# Patient Record
(demographics unavailable — no encounter records)

---

## 2024-10-07 NOTE — HISTORY OF PRESENT ILLNESS
[Former] : former [>= 20 pack years] : >= 20 pack years [FreeTextEntry1] : Hx COPD.  Was here for acute visit 9/20/24 c/o one month ago was cleaning mold in his [place]. Used a bathroom  and the fumes really bothered him while he was scrubbing and took big inhale. Did not have any windows. Since then, his lungs feels scorched, more sob, wheeze. No cough.    Given course of prednisone. CXR 9/20/24 clear  Now feeling well. Back to baseline. PINA back to usual. Felt better on prednisone.   On breztri with spacer.  Albuterol helps a little.  On singulair. Not on daliresp b/c it didn't agree with him.    [place] done 3/28/24 showed severe obstruction; FEV1 not much change 39% pred  Does not do well with dry powder inhalers.   Hx hypoxia with exertion. Using it with exertion mainly.  Some LE edema; b/l.   Sees Dr Richardson.  Previously was ordered for [place]. Reports was called by [facility] [place]; has to call them back. Never did at first b/c of COVID. Now saying too busy.  Quit smoking sandra 2014. Smoked 40 years 2 ppd.   [YearQuit] : 2014

## 2024-10-07 NOTE — PROCEDURE
[FreeTextEntry1] :  CXR 24 clear  oxymetry done 24: O2 saturation on RA at rest: 95% O2 saturation on RA walkin% O2 saturation on 2 LPM pulsed dose walkin%     ------------   EXAM: [id number] - CT LDCT LUNG CA SCREENING - ORDERED BY: RITA LINDO   PROCEDURE DATE: 2024    INTERPRETATION: INDICATION: 100 pack year history of smoking. Individual is Former smoker. Lung cancer screening.  TECHNIQUE: Low dose CT scan of the chest was obtained without intravenous contrast.  COMPARISON: 2023  FINDINGS: Lymph nodes: No enlarged mediastinal, hilar or axillary lymph nodes Heart/vasculature: No pericardial effusion. Thoracic aorta and main pulmonary artery are normal in caliber. Mild atherosclerotic calcifications coronary arteries.  Airways/lungs/pleura: Trachea and central airways are patent. Bilateral upper lobe predominant emphysematous changes. No parenchymal consolidation, pleural effusion or pneumothorax. No bronchiectasis or honeycombing. Scattered calcified granulomas bilaterally.  Right middle lobe 0.2 cm nodule (series 5 image 644), possibly new from prior.. Unchanged right apical 0.2 cm nodule.  Upper abdomen: Partially imaged upper abdomen demonstrates no acute abnormality.  Bones/soft tissues: No suspicious lytic or blastic lesion.  IMPRESSION:  Right middle lobe 0.2 cm nodule, possibly new from prior examination. Unchanged right apical 0.2 cm nodule.  Lung-RADS category: 2: Benign appearance or behavior. Nodules with very low likelihood of becoming a clinically active cancer due to size or lack of growth. Probability of malignancy < 1%.  Recommendation: Continue annual screening with low-dose chest CT in 12 months.  --- End of Report ---       ELLIOT LANDAU MD; Attending Radiologist This document has been electronically signed. 2024 7:28PM

## 2024-10-07 NOTE — PHYSICAL EXAM
[General Appearance - In No Acute Distress] : no acute distress [Normal Conjunctiva] : the conjunctiva exhibited no abnormalities [Low Lying Soft Palate] : low lying soft palate [Neck Appearance] : the appearance of the neck was normal [] : the neck was supple [Heart Rate And Rhythm] : heart rate and rhythm were normal [Heart Sounds] : normal S1 and S2 [Bowel Sounds] : normal bowel sounds [Abdomen Soft] : soft [Abnormal Walk] : normal gait [Nail Clubbing] : no clubbing of the fingernails [Cyanosis, Localized] : no localized cyanosis [1+ Pitting] : 1+  pitting [Skin Color & Pigmentation] : normal skin color and pigmentation [Cranial Nerves] : cranial nerves 2-12 were intact [No Focal Deficits] : no focal deficits [Oriented To Time, Place, And Person] : oriented to person, place, and time [Impaired Insight] : insight and judgment were intact [Affect] : the affect was normal [Normal Rate] : the respiratory rate was normal [Rate ___] : at [unfilled] breaths per minute [Normal Rhythm/Effort] : normal respiratory rhythm and effort [Clear Bilaterally] : the lungs were clear to auscultation bilaterally [Scattered Wheezes] : scattered wheezing was heard [Rales / Crackles Bilateral] : no rales or crackles were heard [Normal Oropharynx] : abnormal oropharynx

## 2024-10-07 NOTE — PLAN
[TextEntry] : Continue breztri. Albuterol prn.  Singulair. O2 to be used with exertion and sleep. Make sure he wears a mask and is in a well-ventilated area if he has to use cleaning products. No Eos so dupixent not indicated. Could not tolerate daliresp. Emphysema pattern does not appear amenable to EBV. GI eval. Flonase, antihistamine. Nasal saline. Cardiology f/u with Dr Richardson. LDCT 3/2025.  Rec pulm rehab; he says he can't do it. Exercise is essential. Annual flu vaccine.

## 2024-10-07 NOTE — REVIEW OF SYSTEMS
[As Noted in HPI] : as noted in HPI [Negative] : Endocrine [Fever] : no fever [Fatigue] : no fatigue [Snoring] : no snoring [Witnessed Apneas] : demonstrated no ~M apnea [Nonrestorative Sleep] : restorative sleep

## 2024-10-07 NOTE — CONSULT LETTER
[Dear  ___] : Dear  [unfilled], [Courtesy Letter:] : I had the pleasure of seeing your patient, [unfilled], in my office today. [Consult Closing:] : Thank you very much for allowing me to participate in the care of this patient.  If you have any questions, please do not hesitate to contact me. [Robert Holder MD] : Robert Holder MD

## 2024-10-10 NOTE — DATA REVIEWED
[Cervical Spine] : cervical spine [MRI] : MRI [Lumbar Spine] : lumbar spine [Report was reviewed and noted in the chart] : The report was reviewed and noted in the chart [I reviewed the films/CD] : I reviewed the films/CD

## 2024-10-10 NOTE — ASSESSMENT
[FreeTextEntry1] : A discussion regarding available pain management treatment options occurred with the patient.  These included interventional, rehabilitative, pharmacological, and alternative modalities. We will proceed with the following:    Interventional treatment options:   - Proceed with right C3-C4, C4-C5, C5-C6 facet joint RFA with fluoroscopic guidance; ASC - Consider interventional treatment options for lumbar spine once adequate relief of cervical - see additional instructions below    Rehabilitative options:   - Previously recommended trial of physical therapy for cervical spine; patient defers - participation in active HEP was discussed and encouraged as tolerated  Medication based treatment options:   - Continue ibuprofen 400-600 mg up to TID as needed - Hold NSAIDs while on oral corticosteroids (per pulmonary) - see additional instructions below    Complementary treatment options:   - Weight management and lifestyle modifications discussed   - Failed prior chiropractic care  Additional treatment recommendations as follows: - Discussed spine surgical evaluation; patient defers at this point - Follow up 4-6 weeks post RFA for assessment of efficacy and further treatment recommendations  The risks, benefits and alternatives of the proposed procedure were explained in detail with the patient. The risks outlined include but are not limited to infection, bleeding, post- dural puncture headache, nerve injury, a temporary increase in pain, failure to resolve symptoms, need for future interventions, allergic reaction, and possible elevation of blood sugar in diabetics if using corticosteroid.  All questions were answered to patient's apparent satisfaction, and he/she verbalized an understanding.  Patient has cervical axial pain that is consistent with facet joint pathology.  A diagnostic temporary block with local anesthetic of the medial branch was performed and has resulted in at least a 50% reduction in pain for the duration of the specific local anesthetic effect.  The pain is not radicular and there is absence of nerve root compression.  There is no prior spinal fusion surgery at the level targeted.  The pain has failed to respond to three months of conservative therapy.  We have discussed the risks, benefits, and alternatives for NSAID therapy including but not limited to the risk of bleeding, thrombosis, gastric mucosal irritation/ulceration, allergic reaction and kidney dysfunction.  The patient verbalizes an understanding.

## 2024-10-10 NOTE — PHYSICAL EXAM
[de-identified] : Constitutional:   - No acute distress   - Well developed; well nourished    Neurological:   - normal mood and affect   - alert and oriented x 3     Cardiovascular:   - grossly normal   Cervical Spine Exam:   Inspection:   erythema (-)   ecchymosis (-)   rashes (-)    Palpation:                                                    Cervical paraspinal tenderness:         R (-); L(-)  Upper trapezius tenderness:              R (+); L (+)  Rhomboids tenderness:                      R (+); L (+)  Occipital Ridge:                                    R (-); L (-)  Supraspinatus tenderness:                 R (-); L (-)   ROM: Restricted all planes pain with extremes of bilateral rotation  Strength Testing:              Deltoid                           R (5/5); L (5/5)  Biceps:                          R (5/5); L (5/5)  Triceps:                         R (5/5); L (5/5)  Finger Abductors:         R (5/5); L (5/5)  Grasp:                           R (5/5); L (5/5)   Special Testing:  Spurling Test:                  R (-); L (-)  Facet load test:               R (+); L (-)  Avila's Sign:               R (-); L (-)   Neuro:  SILT throughout right upper extremity  SILT throughout left upper extremity   Reflexes:  Biceps   -           R (2+); L (2+)  Triceps  -           R (2+); L (2+)  Brachioradialis- R (2+); L (2+)     No ankle clonus    _____________________________________________________  Lumbar Spine Exam:  Inspection: erythema (-) ecchymosis (-) rashes (-) alignment: no scoliosis  Palpation: Midline lumbar tenderness:             (-) midline thoracic tenderness:          (-) Lumbar paraspinal tenderness:      L (+) ; R (+) thoracic paraspinal tenderness:    L (-) ; R (-) sciatic nerve tenderness :             L (-) ; R (-) SI joint tenderness:                        L (-) ; R (-) GTB tenderness:                            L (-);  R (-)  ROM: restricted all planes  pain with extremes of flexion/extension  Strength:                                    Right       Left    Hip Flexion:                (5/5)       (5/5) Quadriceps:               (5/5)       (5/5) Hamstrings:                (5/5)       (5/5) Ankle Dorsiflexion:     (5/5)       (5/5) EHL:                           (5/5)       (5/5) Ankle Plantarflexion:  (5/5)       (5/5)  Special Tests: SLR:                           R (+) ; L (+) Facet loading:            R (+) ; L (+) JUDI test:               R (n/a) ; L (n/a) Hamstring tightness:  R (+);  L (+)  Neurologic: SI LT throughout right lower extremity SI LT throughout left lower extremity  Reflexes normal and symmetric bilateral lower extremities  Gait:  mildly antalgic gait ambulates without assistive device

## 2024-10-10 NOTE — HISTORY OF PRESENT ILLNESS
[Neck] : neck [9] : 9 [6] : 6 [Radiating] : radiating [Stabbing] : stabbing [Throbbing] : throbbing [Constant] : constant [Injection therapy] : injection therapy [FreeTextEntry1] : 10/10/2024 - Patient presents for follow-up visit after a repeat right C3-C4, C4-C5, C5-C6 facet joint MBB on 2024.  He reports 50% reduction of pain the day of the procedure and a gradual return of pain to baseline the following day. Symptoms remain stable and unchanged at this time. Denies any changes in medical history since last visit.  24 - Patient presents for FUV after a Right C3-C4, C4-C5, C5-C6 facet joint MBB on 24. He reports greater than 80% reduction of his pain the day of the procedure followed by return to baseline by the next morning.  Positive diagnostic response.  2024 - Patient presents for FUV after a cervical C7-T1 BENIGNO on 2024. Patient reports soreness in the neck down to the mid back following the injection, after a couple days the soreness subsided.  He states that he got some reduction of pain intensity from the epidural, but he continues to have pain in the right side of the neck radiating to the right upper trapezius area and shoulder.  He did not start physical therapy as recommended.  2024 - Patient presents for FUV to review their cervical MRI from 2024. Patient continues to have pain is in the neck with radiation bilaterally to the upper trapezius area; he has subjective weakness bilaterally in the arms and hands as well as having loss of some balance and fine motor coordination.   2024 - The patient presents for initial evaluation regarding their neck pain. Patient has a long-standing history of pain, he has had injections in the past with Dr. Ojeda but had to change providers due to insurance.  Most recent injections were performed about 2-3 months ago from his recollection.  He states that injections provided very limited short-term relief. Patients pain is in the neck with radiation bilaterally to the upper trapezius area; he has subjective weakness bilaterally in the arms and hands as well as having loss of some balance and fine motor coordination.   Injections outside pain MD): 1) BENIGNO (2024) - Dr. Ojeda  Interventional pain history: 1) C7-T1 BENIGNO - (2024) 2) Right C3-C4, C4-C5, C5-C6 facet joint MBB - (24, 2024)  Pertinent Surgical History: N/A   Imagin) MRI Cervical Spine (2024) - [facility] [place] [facility]  C2-C3 level: Mild bilateral facet arthropathy. No disc herniation. No central canal or foraminal narrowing. C3-C4 level: Broad-based posterior disc osteophyte complex and mild to moderate bilateral facet arthropathy result in moderate bilateral neural foraminal narrowing but no significant central canal narrowing. C4-C5 level:  Broad-based posterior disc bulge with superimposed right intraforaminal protrusion in conjunction with moderate bilateral facet arthropathy results in mild to moderate left neural foraminal narrowing, moderate right neural foraminal narrowing but no significant central canal narrowing. C5-C6 level: Broad-based posterior disc osteophyte complex and moderate bilateral facet arthropathy result in mild to moderate central canal narrowing, moderate right neural foraminal narrowing and mild to moderate left neural foraminal narrowing. C6-C7 level: Broad-based posterior disc bulge and moderate bilateral facet arthropathy result in moderate bilateral neural foraminal narrowing but no significant central canal narrowing. C7-T1 level: No disc herniation. No central canal or foraminal narrowing.  2) MRI Lumbar Spine (2024) - [facility] [place] [facility]  L1-L2: Minimal disc bulging without spinal canal or foraminal narrowing. L2-L3: Moderate disc height loss with moderate disc bulging and endplate osteophyte formation. Mild foraminal narrowing. L3-L4: Moderate disc bulging with moderate disc height loss. Small endplate osteophyte formation. Moderate left and mild right foraminal narrowing. Mild lateral recess stenosis. L4-L5: Moderate disc bulging and small endplate osteophyte formation. Mild to moderate bilateral foraminal narrowing. Moderate left and mild right lateral recess stenosis. L5-S1: Moderate disc bulging with small endplate osteophyte formation. Moderate to severe bilateral foraminal narrowing  Physician Disclaimer: I have personally reviewed and confirmed all HPI data with the patient.  [] : no [FreeTextEntry7] : b/l shoulder blades  [de-identified] : Driving  [de-identified] : C MRI

## 2024-10-28 NOTE — REVIEW OF SYSTEMS
S/P  [Postnasal Drip] : postnasal drip [Nasal Discharge] : nasal discharge [Shortness Of Breath] : shortness of breath [Wheezing] : wheezing [Cough] : cough [Dyspnea on Exertion] : dyspnea on exertion [Negative] : Heme/Lymph

## 2024-10-28 NOTE — HISTORY OF PRESENT ILLNESS
[FreeTextEntry8] : 8 days of cough , sore throat , no fever, productive mucus white , trouble sleeping  some nasal congestion  states went to urgent care last week given doxycycline and prednisone for 4 days  states completed and still persisting symptoms - , positive sob , no chest pain , no abdominal pain  chronic left heel pain worse with stepping on it

## 2024-10-28 NOTE — PHYSICAL EXAM
[No Acute Distress] : no acute distress [Well Nourished] : well nourished [Well Developed] : well developed [Well-Appearing] : well-appearing [Normal Voice/Communication] : normal voice/communication [Normal Sclera/Conjunctiva] : normal sclera/conjunctiva [PERRL] : pupils equal round and reactive to light [EOMI] : extraocular movements intact [Normal Outer Ear/Nose] : the outer ears and nose were normal in appearance [Normal Oropharynx] : the oropharynx was normal [Normal TMs] : both tympanic membranes were normal [No JVD] : no jugular venous distention [No Lymphadenopathy] : no lymphadenopathy [Supple] : supple [No Respiratory Distress] : no respiratory distress  [No Accessory Muscle Use] : no accessory muscle use [Normal Rate] : normal rate  [Regular Rhythm] : with a regular rhythm [Normal S1, S2] : normal S1 and S2 [No Murmur] : no murmur heard [No Edema] : there was no peripheral edema [Soft] : abdomen soft [Non Tender] : non-tender [Non-distended] : non-distended [No Masses] : no abdominal mass palpated [No HSM] : no HSM [Normal Bowel Sounds] : normal bowel sounds [Normal Posterior Cervical Nodes] : no posterior cervical lymphadenopathy [Normal Anterior Cervical Nodes] : no anterior cervical lymphadenopathy [No Joint Swelling] : no joint swelling [Grossly Normal Strength/Tone] : grossly normal strength/tone [Coordination Grossly Intact] : coordination grossly intact [No Focal Deficits] : no focal deficits [Normal Gait] : normal gait [Speech Grossly Normal] : speech grossly normal [Normal Affect] : the affect was normal [Alert and Oriented x3] : oriented to person, place, and time [Normal Mood] : the mood was normal [Normal Insight/Judgement] : insight and judgment were intact [de-identified] : coarse bs and wheezing throughout  [de-identified] : left heel mild tenderness to deep palpation  [de-identified] : ecchymosis on forearm and hyperpigmentation of bilateral arms

## 2024-10-28 NOTE — ASSESSMENT
[FreeTextEntry1] : stat chest xray r/o pneumonia  discussed compliance with inhalers  start prednisone taper   followup labs  xray foot r/o heel spur , discussed rolling foot on frozen water bottle

## 2024-11-07 NOTE — PROCEDURE
[FreeTextEntry1] : [place] done 24: mod obstruction by ATS criteria; severe by GOLD; FEV1 decreased from previous   CXR 24 clear  oxymetry done 24: O2 saturation on RA at rest: 95% O2 saturation on RA walkin% O2 saturation on 2 LPM pulsed dose walkin%     ------------   EXAM: [id number] - CT LDCT LUNG CA SCREENING - ORDERED BY: RITA LINDO   PROCEDURE DATE: 2024    INTERPRETATION: INDICATION: 100 pack year history of smoking. Individual is Former smoker. Lung cancer screening.  TECHNIQUE: Low dose CT scan of the chest was obtained without intravenous contrast.  COMPARISON: 2023  FINDINGS: Lymph nodes: No enlarged mediastinal, hilar or axillary lymph nodes Heart/vasculature: No pericardial effusion. Thoracic aorta and main pulmonary artery are normal in caliber. Mild atherosclerotic calcifications coronary arteries.  Airways/lungs/pleura: Trachea and central airways are patent. Bilateral upper lobe predominant emphysematous changes. No parenchymal consolidation, pleural effusion or pneumothorax. No bronchiectasis or honeycombing. Scattered calcified granulomas bilaterally.  Right middle lobe 0.2 cm nodule (series 5 image 644), possibly new from prior.. Unchanged right apical 0.2 cm nodule.  Upper abdomen: Partially imaged upper abdomen demonstrates no acute abnormality.  Bones/soft tissues: No suspicious lytic or blastic lesion.  IMPRESSION:  Right middle lobe 0.2 cm nodule, possibly new from prior examination. Unchanged right apical 0.2 cm nodule.  Lung-RADS category: 2: Benign appearance or behavior. Nodules with very low likelihood of becoming a clinically active cancer due to size or lack of growth. Probability of malignancy < 1%.  Recommendation: Continue annual screening with low-dose chest CT in 12 months.  --- End of Report ---       ELLIOT LANDAU MD; Attending Radiologist This document has been electronically signed. 2024 7:28PM

## 2024-11-07 NOTE — CONSULT LETTER
[Dear  ___] : Dear  [unfilled], [Courtesy Letter:] : I had the pleasure of seeing your patient, [unfilled], in my office today. [Consult Closing:] : Thank you very much for allowing me to participate in the care of this patient.  If you have any questions, please do not hesitate to contact me. [Rboert Holder MD] : Robert Holder MD

## 2024-11-07 NOTE — HISTORY OF PRESENT ILLNESS
[Former] : former [>= 20 pack years] : >= 20 pack years [FreeTextEntry1] : Hx COPD.  Was here for acute visit 9/20/24 c/o one month prior was cleaning mold in his BR. Used a bathroom  and the fumes really bothered him while he was scrubbing and took big inhale. Did not have any windows. Since then, his lungs feels scorched, more sob, wheeze. No cough.    Given course of prednisone 9/20/24. CXR 9/20/24 clear  2 weeks ago began with increased sob, chest congestion, difficult to expectorate, wheeze.   ANother course of prednisone a few weeks ago.   On breztri with spacer.  Albuterol helps a little.  On singulair. Not on daliresp b/c it didn't agree with him.    [place] done 3/28/24 showed severe obstruction; FEV1 not much change 39% pred  Does not do well with dry powder inhalers.   Hx hypoxia with exertion. Using it with exertion mainly.  Some LE edema; b/l.   Sees Dr Richardson.  Previously was ordered for [place]. Reports was called by [facility] [place]; has to call them back. Never did at first b/c of COVID. Now saying too busy.  Quit smoking [place]ra 2014. Smoked 40 years 2 ppd.   [YearQuit] : 2014

## 2024-11-07 NOTE — PLAN
[TextEntry] : For now, another course of prednisone. Add Zithro TIW as maintenance. Get acapella valve; gave instructions and picture of the device. Continue breztri. Albuterol prn.  Singulair. O2 to be used with exertion and sleep. Make sure he wears a mask and is in a well-ventilated area if he has to use cleaning products. No Eos so dupixent not indicated. Could not tolerate daliresp. Emphysema pattern does not appear amenable to EBV. GI eval. Flonase, antihistamine. Nasal saline. Cardiology f/u with Dr Richardson. LDCT 3/2025.  Recc pulm rehab; he says he can't do it. Exercise is essential. Annual flu vaccine.   Lung transplant referral sent as well to [facility] Lung Transplant Team.

## 2024-11-11 NOTE — END OF VISIT
[FreeTextEntry3] : I saw and examined the pt Mr. Guy at [facility] onnov 11 2024  [Time Spent: ___ minutes] : I have spent [unfilled] minutes of time on the encounter which excludes teaching and separately reported services.

## 2024-11-11 NOTE — HISTORY OF PRESENT ILLNESS
[Former] : former [TextBox_4] : HPI: 67 y/o male PMH COPD (2 ppd for 40 years, stopped 2014), referred by Dr. Holder for transplant evaluation. No  presents with increasing excercise intolerance and dyspnea at rest NYHA class 3    PMH: cer  When/how diagnosed with primary pulm dx?  PSH: social etoh  - smoking - drugs  Meds:  Allergies: NK       Oxygen Requirement:  *** at rest ** on exertion  *** with sleep   High Flow Nasal cannula: [ ] yes [x ] no    Working for income:   [ ] yes [ ] no     Diabetic: [ ] yes [ ] no    Insulin dependent [ ] yes [x ] no       Performs activities of daily living with NONE/SOME/TOTAL assistance                 Occupation:      Exposures:      Quality of life:      Attends Pulmonary Rehab: N, was referred by Dr. Holder but patient states he is "too busy" Prior hospitalizations, ICU admission or intubations:  Hx covid infection, blood transfusions or pregnancies:             Health maintenance/vaccines:      COVID vax:       Family History:  Social History:      Lives with:   ETOH/tobacco use:   DATA REVIEWED:   PFT/JADE:  11/07/2024 FVC 2.44, 61% FEV1 0.98, 32%  12/12/2023 FVC 2.85, 66% FEV1 1.30, 38%  6MWT:   CT CHEST:  03/28/2024 Trachea and central airways are patent. Bilateral upper lobe predominant emphysematous changes. No parenchymal consolidation, pleural effusion or pneumothorax. No bronchiectasis or honeycombing. Scattered calcified granulomas bilaterally. Right middle lobe 0.2 cm nodule, possibly new from prior examination. Unchanged right apical 0.2 cm nodule.  ECHO:   RHC/LHC:   Above discussed with attending physician Dr. Kearns  All questions answered, used teach back method, patient verbalized understanding. [TextBox_11] : 2 [TextBox_13] : 40 [YearQuit] : 2014

## 2024-11-11 NOTE — ASSESSMENT
[FreeTextEntry1] : 67 yo copd   relatively early for transplant but severe symptoms  of dyspnea and exercise intolerance.  Dissatisfied with qol.  plan financial clearence   education for transplant  risks and benefits explained and he would like to move forward with the education process   I communicated e;lectronically with the transplant team

## 2024-11-11 NOTE — PHYSICAL EXAM
[II] : Mallampati Class: II [Normal Appearance] : normal appearance [Normal Rate/Rhythm] : normal rate/rhythm [Rhonchi] : rhonchi [No Abnormalities] : no abnormalities [Normal Gait] : normal gait [No Clubbing] : no clubbing [Normal Color/ Pigmentation] : normal color/ pigmentation [No Focal Deficits] : no focal deficits [No Sensory Deficits] : no sensory deficits [Cranial Nerves Intact] : cranial nerves intact [Oriented x3] : oriented x3 [TextBox_68] : hyperresonant note to precussion

## 2024-12-09 NOTE — HISTORY OF PRESENT ILLNESS
[FreeTextEntry1] : pt here for f/u visit [de-identified] : admits to doing 150mg of trazodone was helping sleep better - would like new script -  no chest pain, no sob, no cough, no fever, no dizziness, no abdominal pain, no n/v/d/c/melena/brbpr/hematuria/dysuria

## 2024-12-13 NOTE — HISTORY OF PRESENT ILLNESS
[TextBox_4] : 65 y/o F PMHx COPD, here today with his family member for pre-lung transplant evaluation education and consent.  The objectives of this evaluation are to educate the transplant patient about their rights, the process of evaluation, surgery and post-transplant care.  Specific topics discussed in this meeting included but were not limited to the following:  The evaluation/listing process, including the need for physical evaluation, laboratory, consultants and transplant specific diagnostic testing. Patient selection criteria and suitability for transplantation. The transplant surgical procedure/post-operative treatment. Alternative treatments to transplant. Potential organ donor risk factors with PHS increased risk behaviors, including option of receiving a hepatitis C lung. Potential medical and/or psychosocial risks to transplant.  The importance of a strong and reliable support system throughout the lung transplant process.  The patient received an educational video link, electronic copy and paper copy of educational resources.  The patient and family members were encouraged to ask questions pertaining to any teaching they received at the evaluation which were then answered prior to the conclusion of the appointment. The patient was given information on how and when to contact the Lung Transplant Office to discuss their candidacy or listing status after their case was reviewed by the Selection Committee. The patient and support persons were also encouraged to contact the Lung Transplant Office at anytime should they have any further questions or issues.  We went over the risks and benefits of lung transplantation including one and five year survival. The median survival after a double lung transplant was discussed in detail. We also went over the risks of opportunistic infections and the risks of calcineurin inhibitor use after the transplant with inherent risks of neoplasms and opportunistic infections and other complications. The post-operative recovery period was explained in detail including the need for mechanical ventilation and other means of cardiopulmonary support including extracorporal membrane oxygenation use and the types of incisions and chest tubes that are required.  All questions answered. Patient and her support persons verbalized understanding. Time spent with patient: 75 minutes.  #lung transplant consent -Educated and consented today - needs ABO and PRAs drawn -Labs and diagnostics ordered, to be scheduled - Accepts PHS risk -Continue to follow up with primary pulmonologist Dr. Holder  RTC in 4-6 weeks.

## 2024-12-23 NOTE — DATA REVIEWED
[Cervical Spine] : cervical spine [MRI] : MRI [Lumbar Spine] : lumbar spine [Report was reviewed and noted in the chart] : The report was reviewed and noted in the chart [I reviewed the films/CD] : I reviewed the films/CD [I independently reviewed and interpreted images and report] : I independently reviewed and interpreted images and report

## 2024-12-23 NOTE — PROCEDURE
[Trigger point 3 or more muscle groups] : Trigger point 3 or more muscle groups [Cervical paraspinal muscle] : cervical paraspinal muscle [Trapezius muscle] : trapezius muscle [Rhomboid muscle] : rhomboid muscle [Pain] : pain [Inflammation] : inflammation [Alcohol] : alcohol [Ethyl Chloride sprayed topically] : ethyl chloride sprayed topically [Sterile technique used] : sterile technique used [___ cc    0.25%] : Bupivacaine (Marcaine) ~Vcc of 0.25%  [] : Patient tolerated procedure well [Call if redness, pain or fever occur] : call if redness, pain or fever occur [Apply ice for 15min out of every hour for the next 12-24 hours as tolerated] : apply ice for 15 minutes out of every hour for the next 12-24 hours as tolerated [Bilateral] : bilaterally of the [Risks, benefits, alternatives discussed / Verbal consent obtained] : the risks benefits, and alternatives have been discussed, and verbal consent was obtained

## 2024-12-23 NOTE — PHYSICAL EXAM
[de-identified] : Constitutional:   - No acute distress   - Well developed; well nourished    Neurological:   - normal mood and affect   - alert and oriented x 3     Cardiovascular:   - grossly normal   Cervical Spine Exam:   Inspection:   erythema (-)   ecchymosis (-)   rashes (-)    Palpation:                                                    Cervical paraspinal tenderness:         R (+); L (+)  Upper trapezius tenderness:              R (+); L (+)  Rhomboids tenderness:                     R (+); L (+)  Occipital Ridge:                                  R (-); L (-)  Supraspinatus tenderness:                 R (-); L (-)   ROM: Restricted all planes pain with extremes of bilateral rotation  Strength Testing:              Deltoid                           R (5/5); L (5/5)  Biceps:                          R (5/5); L (5/5)  Triceps:                         R (5/5); L (5/5)  Finger Abductors:         R (5/5); L (5/5)  Grasp:                           R (5/5); L (5/5)   Special Testing:  Spurling Test:                  R (-); L (-)  Facet load test:               R (+); L (+)  Avila's Sign:               R (-); L (-)   Neuro:  SILT throughout right upper extremity  SILT throughout left upper extremity   Reflexes:  Biceps   -           R (2+); L (2+)  Triceps  -           R (2+); L (2+)  Brachioradialis- R (2+); L (2+)     No ankle clonus   _____________________________________________________  Lumbar Spine Exam:  Inspection: erythema (-) ecchymosis (-) rashes (-) alignment: no scoliosis  Palpation: Midline lumbar tenderness:             (-) midline thoracic tenderness:          (-) Lumbar paraspinal tenderness:      L (+) ; R (+) thoracic paraspinal tenderness:    L (-) ; R (-) sciatic nerve tenderness :             L (-) ; R (-) SI joint tenderness:                        L (-) ; R (-) GTB tenderness:                            L (-);  R (-)  ROM: restricted all planes pain with extremes of flexion/extension  Strength:                                    Right       Left    Hip Flexion:                (5/5)       (5/5) Quadriceps:               (5/5)       (5/5) Hamstrings:                (5/5)       (5/5) Ankle Dorsiflexion:     (5/5)       (5/5) EHL:                           (5/5)       (5/5) Ankle Plantarflexion:  (5/5)       (5/5)  Special Tests: SLR:                           R (+) ; L (+) Facet loading:            R (+) ; L (+) JUDI test:               R (n/a) ; L (n/a) Hamstring tightness:  R (+);  L (+)  Neurologic: SI LT throughout right lower extremity SI LT throughout left lower extremity  Reflexes normal and symmetric bilateral lower extremities  Gait:  mildly antalgic gait ambulates without assistive device

## 2024-12-23 NOTE — ASSESSMENT
[FreeTextEntry1] : A discussion regarding available pain management treatment options occurred with the patient.  These included interventional, rehabilitative, pharmacological, and alternative modalities. We will proceed with the following:    Interventional treatment options: - Proceed with TPI today for myofascial pain component - Patient s/p right C3-C4, C4-C5, C5-C6 facet joint RFA; patient was counseled regarding realistic expectations for interventional modalities given advanced degenerative findings - Consider interventional treatment options for lumbar spine once adequate relief of cervical - see additional instructions below    Rehabilitative options:   - Initiate trial of physical therapy for cervical spine; prescription provided today - participation in active HEP was discussed and encouraged as tolerated  Medication based treatment options:   - Continue ibuprofen 400-600 mg up to TID as needed - see additional instructions below    Complementary treatment options:   - Weight management and lifestyle modifications discussed   - Failed prior chiropractic care  Additional treatment recommendations as follows: - Previously discussed spine surgical evaluation; poor candidate due to advanced lung disease - Follow up in 6-8 weeks to assess response to rehabilitative care  We have discussed the risks, benefits, and alternatives for NSAID therapy including but not limited to the risk of bleeding, thrombosis, gastric mucosal irritation/ulceration, allergic reaction and kidney dysfunction.  The patient verbalizes an understanding.  I, Shira Duran, acting as scribe, attest that this documentation has been prepared under the direction and in the presence of Provider Carrington Valles DO.  The documentation recorded by the scribe, in my presence, accurately reflects the service I personally performed, and the decisions made by me with my edits as appropriate.

## 2024-12-23 NOTE — PHYSICAL EXAM
[de-identified] : Constitutional:   - No acute distress   - Well developed; well nourished    Neurological:   - normal mood and affect   - alert and oriented x 3     Cardiovascular:   - grossly normal   Cervical Spine Exam:   Inspection:   erythema (-)   ecchymosis (-)   rashes (-)    Palpation:                                                    Cervical paraspinal tenderness:         R (+); L (+)  Upper trapezius tenderness:              R (+); L (+)  Rhomboids tenderness:                     R (+); L (+)  Occipital Ridge:                                  R (-); L (-)  Supraspinatus tenderness:                 R (-); L (-)   ROM: Restricted all planes pain with extremes of bilateral rotation  Strength Testing:              Deltoid                           R (5/5); L (5/5)  Biceps:                          R (5/5); L (5/5)  Triceps:                         R (5/5); L (5/5)  Finger Abductors:         R (5/5); L (5/5)  Grasp:                           R (5/5); L (5/5)   Special Testing:  Spurling Test:                  R (-); L (-)  Facet load test:               R (+); L (+)  Avila's Sign:               R (-); L (-)   Neuro:  SILT throughout right upper extremity  SILT throughout left upper extremity   Reflexes:  Biceps   -           R (2+); L (2+)  Triceps  -           R (2+); L (2+)  Brachioradialis- R (2+); L (2+)     No ankle clonus   _____________________________________________________  Lumbar Spine Exam:  Inspection: erythema (-) ecchymosis (-) rashes (-) alignment: no scoliosis  Palpation: Midline lumbar tenderness:             (-) midline thoracic tenderness:          (-) Lumbar paraspinal tenderness:      L (+) ; R (+) thoracic paraspinal tenderness:    L (-) ; R (-) sciatic nerve tenderness :             L (-) ; R (-) SI joint tenderness:                        L (-) ; R (-) GTB tenderness:                            L (-);  R (-)  ROM: restricted all planes pain with extremes of flexion/extension  Strength:                                    Right       Left    Hip Flexion:                (5/5)       (5/5) Quadriceps:               (5/5)       (5/5) Hamstrings:                (5/5)       (5/5) Ankle Dorsiflexion:     (5/5)       (5/5) EHL:                           (5/5)       (5/5) Ankle Plantarflexion:  (5/5)       (5/5)  Special Tests: SLR:                           R (+) ; L (+) Facet loading:            R (+) ; L (+) JUDI test:               R (n/a) ; L (n/a) Hamstring tightness:  R (+);  L (+)  Neurologic: SI LT throughout right lower extremity SI LT throughout left lower extremity  Reflexes normal and symmetric bilateral lower extremities  Gait:  mildly antalgic gait ambulates without assistive device

## 2024-12-23 NOTE — HISTORY OF PRESENT ILLNESS
[Neck] : neck [8] : 8 [4] : 4 [Constant] : constant [Household chores] : household chores [Leisure] : leisure [Nothing helps with pain getting better] : Nothing helps with pain getting better [Retired] : Work status: retired [FreeTextEntry1] : 2024 - Patient presents for FUV after a right C3-C4, C4-C5, C5-C6 facet joint RFA on 2024.  He reports no appreciable change following the procedure.  Feels a lot of ongoing "soreness" on the right side of his neck.  He denies any radicular symptoms.  10/10/2024 - Patient presents for follow-up visit after a repeat right C3-C4, C4-C5, C5-C6 facet joint MBB on 2024.  He reports 50% reduction of pain the day of the procedure and a gradual return of pain to baseline the following day. Symptoms remain stable and unchanged at this time. Denies any changes in medical history since last visit.  24 - Patient presents for FUV after a Right C3-C4, C4-C5, C5-C6 facet joint MBB on 24. He reports greater than 80% reduction of his pain the day of the procedure followed by return to baseline by the next morning.  Positive diagnostic response.  2024 - Patient presents for FUV after a cervical C7-T1 BENIGNO on 2024. Patient reports soreness in the neck down to the mid back following the injection, after a couple days the soreness subsided.  He states that he got some reduction of pain intensity from the epidural, but he continues to have pain in the right side of the neck radiating to the right upper trapezius area and shoulder.  He did not start physical therapy as recommended.  2024 - Patient presents for FUV to review their cervical MRI from 2024. Patient continues to have pain is in the neck with radiation bilaterally to the upper trapezius area; he has subjective weakness bilaterally in the arms and hands as well as having loss of some balance and fine motor coordination.   2024 - The patient presents for initial evaluation regarding their neck pain. Patient has a long-standing history of pain, he has had injections in the past with Dr. Ojeda but had to change providers due to insurance.  Most recent injections were performed about 2-3 months ago from his recollection.  He states that injections provided very limited short-term relief. Patients pain is in the neck with radiation bilaterally to the upper trapezius area; he has subjective weakness bilaterally in the arms and hands as well as having loss of some balance and fine motor coordination.   Injections outside pain MD): 1) BENIGNO (2024) - Dr. Ojeda  Interventional pain history: 1) C7-T1 BENIGNO - (2024) 2) Right C3-C4, C4-C5, C5-C6 facet joint MBB - (24, 2024) 3) Right C3-C4, C4-C5, C5-C6 facet joint RFA (2024)  Pertinent Surgical History: N/A   Imagin) MRI Cervical Spine (2024) - [facility]  C2-C3 level: Mild bilateral facet arthropathy. No disc herniation. No central canal or foraminal narrowing. C3-C4 level: Broad-based posterior disc osteophyte complex and mild to moderate bilateral facet arthropathy result in moderate bilateral neural foraminal narrowing but no significant central canal narrowing. C4-C5 level:  Broad-based posterior disc bulge with superimposed right intraforaminal protrusion in conjunction with moderate bilateral facet arthropathy results in mild to moderate left neural foraminal narrowing, moderate right neural foraminal narrowing but no significant central canal narrowing. C5-C6 level: Broad-based posterior disc osteophyte complex and moderate bilateral facet arthropathy result in mild to moderate central canal narrowing, moderate right neural foraminal narrowing and mild to moderate left neural foraminal narrowing. C6-C7 level: Broad-based posterior disc bulge and moderate bilateral facet arthropathy result in moderate bilateral neural foraminal narrowing but no significant central canal narrowing. C7-T1 level: No disc herniation. No central canal or foraminal narrowing.  2) MRI Lumbar Spine (2024) - [facility]  L1-L2: Minimal disc bulging without spinal canal or foraminal narrowing. L2-L3: Moderate disc height loss with moderate disc bulging and endplate osteophyte formation. Mild foraminal narrowing. L3-L4: Moderate disc bulging with moderate disc height loss. Small endplate osteophyte formation. Moderate left and mild right foraminal narrowing. Mild lateral recess stenosis. L4-L5: Moderate disc bulging and small endplate osteophyte formation. Mild to moderate bilateral foraminal narrowing. Moderate left and mild right lateral recess stenosis. L5-S1: Moderate disc bulging with small endplate osteophyte formation. Moderate to severe bilateral foraminal narrowing  Physician Disclaimer: I have personally reviewed and confirmed all HPI data with the patient.  [] : Post Surgical Visit: no [FreeTextEntry6] : SORENESS  [de-identified] : ACTIVITY, DRIVING  [de-identified] : C MRI AT [facility]

## 2024-12-23 NOTE — HISTORY OF PRESENT ILLNESS
[Neck] : neck [8] : 8 [4] : 4 [Constant] : constant [Household chores] : household chores [Leisure] : leisure [Nothing helps with pain getting better] : Nothing helps with pain getting better [Retired] : Work status: retired [FreeTextEntry1] : 2024 - Patient presents for FUV after a right C3-C4, C4-C5, C5-C6 facet joint RFA on 2024.  He reports no appreciable change following the procedure.  Feels a lot of ongoing "soreness" on the right side of his neck.  He denies any radicular symptoms.  10/10/2024 - Patient presents for follow-up visit after a repeat right C3-C4, C4-C5, C5-C6 facet joint MBB on 2024.  He reports 50% reduction of pain the day of the procedure and a gradual return of pain to baseline the following day. Symptoms remain stable and unchanged at this time. Denies any changes in medical history since last visit.  24 - Patient presents for FUV after a Right C3-C4, C4-C5, C5-C6 facet joint MBB on 24. He reports greater than 80% reduction of his pain the day of the procedure followed by return to baseline by the next morning.  Positive diagnostic response.  2024 - Patient presents for FUV after a cervical C7-T1 BENIGNO on 2024. Patient reports soreness in the neck down to the mid back following the injection, after a couple days the soreness subsided.  He states that he got some reduction of pain intensity from the epidural, but he continues to have pain in the right side of the neck radiating to the right upper trapezius area and shoulder.  He did not start physical therapy as recommended.  2024 - Patient presents for FUV to review their cervical MRI from 2024. Patient continues to have pain is in the neck with radiation bilaterally to the upper trapezius area; he has subjective weakness bilaterally in the arms and hands as well as having loss of some balance and fine motor coordination.   2024 - The patient presents for initial evaluation regarding their neck pain. Patient has a long-standing history of pain, he has had injections in the past with Dr. Ojeda but had to change providers due to insurance.  Most recent injections were performed about 2-3 months ago from his recollection.  He states that injections provided very limited short-term relief. Patients pain is in the neck with radiation bilaterally to the upper trapezius area; he has subjective weakness bilaterally in the arms and hands as well as having loss of some balance and fine motor coordination.   Injections outside pain MD): 1) BENIGNO (2024) - Dr. Ojeda  Interventional pain history: 1) C7-T1 BENIGNO - (2024) 2) Right C3-C4, C4-C5, C5-C6 facet joint MBB - (24, 2024) 3) Right C3-C4, C4-C5, C5-C6 facet joint RFA (2024)  Pertinent Surgical History: N/A   Imagin) MRI Cervical Spine (2024) - [facility]  C2-C3 level: Mild bilateral facet arthropathy. No disc herniation. No central canal or foraminal narrowing. C3-C4 level: Broad-based posterior disc osteophyte complex and mild to moderate bilateral facet arthropathy result in moderate bilateral neural foraminal narrowing but no significant central canal narrowing. C4-C5 level:  Broad-based posterior disc bulge with superimposed right intraforaminal protrusion in conjunction with moderate bilateral facet arthropathy results in mild to moderate left neural foraminal narrowing, moderate right neural foraminal narrowing but no significant central canal narrowing. C5-C6 level: Broad-based posterior disc osteophyte complex and moderate bilateral facet arthropathy result in mild to moderate central canal narrowing, moderate right neural foraminal narrowing and mild to moderate left neural foraminal narrowing. C6-C7 level: Broad-based posterior disc bulge and moderate bilateral facet arthropathy result in moderate bilateral neural foraminal narrowing but no significant central canal narrowing. C7-T1 level: No disc herniation. No central canal or foraminal narrowing.  2) MRI Lumbar Spine (2024) - [facility]  L1-L2: Minimal disc bulging without spinal canal or foraminal narrowing. L2-L3: Moderate disc height loss with moderate disc bulging and endplate osteophyte formation. Mild foraminal narrowing. L3-L4: Moderate disc bulging with moderate disc height loss. Small endplate osteophyte formation. Moderate left and mild right foraminal narrowing. Mild lateral recess stenosis. L4-L5: Moderate disc bulging and small endplate osteophyte formation. Mild to moderate bilateral foraminal narrowing. Moderate left and mild right lateral recess stenosis. L5-S1: Moderate disc bulging with small endplate osteophyte formation. Moderate to severe bilateral foraminal narrowing  Physician Disclaimer: I have personally reviewed and confirmed all HPI data with the patient.  [] : Post Surgical Visit: no [FreeTextEntry6] : SORENESS  [de-identified] : ACTIVITY, DRIVING  [de-identified] : C MRI AT [facility]

## 2025-01-03 NOTE — HISTORY OF PRESENT ILLNESS
[FreeTextEntry1] : HPI: 67 y/o male PMH COPD (2 ppd for 40 years, stopped 2014), referred by Dr. Holder for transplant evaluation. Not in pulm rehab   Referred by Dr. Holder  Dx with COPD in 2013, followed by Dr. Holder for the past 4 years.  Reports since 2013 it has progressed.  Has been on O2 the past year and a half. He was started on O2 due to desats with exertion, sitting still does not need O2. 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 He is a close to daily drinker.  He has a divorce pending and had to move.  A lot of stressors. Lives alone.  Sister lives close. Has kids, not around. Has best friend as support too who lives in [place].   He reports he recently had a resp infection and had severe SOB.  Feels better since treatment with Azithro/steroids.  Walking room to room at home gets SOB and d=needs O2. Reports when getting up out of bed has several SOB.  Denies CP.  Reports some LE edema as well "sometimes gets pretty bad". Follows Dr. Freddy Richardson for cards, no water pills.   A1C: 6.1 ABO:  PRA's:   PFT/JADE: 11/07/2024 FVC 2.44, 61% FEV1 0.98, 32%  12/12/2023 FVC 2.85, 66% FEV1 1.30, 38%  6MWT:  CT CHEST: 03/28/2024 Trachea and central airways are patent. Bilateral upper lobe predominant emphysematous changes. No parenchymal consolidation, pleural effusion or pneumothorax. No bronchiectasis or honeycombing. Scattered calcified granulomas bilaterally. Right middle lobe 0.2 cm nodule, possibly new from prior examination. Unchanged right apical 0.2 cm nodule.  ECHO:  RHC/LHC:

## 2025-01-03 NOTE — ASSESSMENT
[FreeTextEntry1] : I saw and examined the patient, reviewed the patients imaging, medical records, reports and discussed the case with the advanced care provider and lung transplant pulmonologist. The patient has COPD and chronic respiratory failure. His symptoms have worsened. He reported of shortness of breath with walking on short distances. 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 He did not wear his oxygen all the time because it was inconvienient.   His physique is still well preserved, and he is motivated to have lung transplant. I think the patient will benefit from, and is a candidate for, double lung transplant.  I have encouraged pt to use O2 at all times, increase his exercise stamina/endurance to the best of his ability and enroll in pulmonary rehab.  Pt will undergo full evaluation with repeat CT chest, TTE and cardiac cath.   The final decision on the patient's lung transplant candidacy will be determined by the lung transplant team at the selection meeting.   I explained the lung transplant process, emphasizing the surgical aspect and post-operative recovery after lung transplant with the patient.  I discussed the risks, benefits and alternatives to lung transplant surgery, the pros and cons associated with lung transplant with the patient.  Risks included, but not limited to, bleeding, stroke, re-operation, arrhythmia, heart attack, primary graft dysfunction that needs ECMO, kidney problems, liver problems, lungs problems, blood transfusion, infections, prolong ventilator support, need for tracheostomy and death. The operation will last approximately 8 to twelve hours.  The duration of hospital stay depends on how quickly one recovers, one's medical history and any complications one may have. The hospital stay after lung transplant is usually around 20-30 days.  I discussed medications and specifics associated with transplant as well as transplant eligibility/listing and the option for second opinion and dual listing at other centers.  PHS donors, Hepatitis C, DCD-EVLP and high-risk donors also discussed in detail with patient as well.  I quoted a one-year survival after transplant is 85% or higher. This statistic takes into account the operative mortality, infection, rejection and other complications within the first year after transplant.  I answered questions the patient and caregiver have to their satisfaction.

## 2025-01-03 NOTE — PHYSICAL EXAM
[General Appearance - Alert] : alert [Sclera] : the sclera and conjunctiva were normal [Neck Appearance] : the appearance of the neck was normal [Jugular Venous Distention Increased] : there was no jugular-venous distention [] : no respiratory distress [Respiration, Rhythm And Depth] : normal respiratory rhythm and effort [Exaggerated Use Of Accessory Muscles For Inspiration] : no accessory muscle use [Heart Rate And Rhythm] : heart rate was normal and rhythm regular [Apical Impulse] : the apical impulse was normal [Heart Sounds] : normal S1 and S2 [Abdomen Tenderness] : non-tender [Involuntary Movements] : no involuntary movements were seen [No Focal Deficits] : no focal deficits [Oriented To Time, Place, And Person] : oriented to person, place, and time [Impaired Insight] : insight and judgment were intact [Affect] : the affect was normal [Mood] : the mood was normal

## 2025-01-03 NOTE — REVIEW OF SYSTEMS
[Feeling Poorly] : feeling poorly [Feeling Tired] : feeling tired [As noted in HPI] : as noted in HPI [Palpitations] : palpitations [Lower Ext Edema] : lower extremity edema [As Noted in HPI] : as noted in HPI [SOB on Exertion] : shortness of breath during exertion [Negative] : Heme/Lymph [Chest Pain] : no chest pain

## 2025-01-03 NOTE — HISTORY OF PRESENT ILLNESS
[FreeTextEntry1] : HPI: 65 y/o male PMH COPD (2 ppd for 40 years, stopped 2014), referred by Dr. Holder for transplant evaluation. Not in pulm rehab   Referred by Dr. Holder  Dx with COPD in 2013, followed by Dr. Holder for the past 4 years.  Reports since 2013 it has progressed.  Has been on O2 the past year and a half. He was started on O2 due to desats with exertion, sitting still does not need O2. 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 He is a close to daily drinker.  He has a divorce pending and had to move.  A lot of stressors. Lives alone.  Sister lives close. Has kids, not around. Has best friend as support too who lives in [place].   He reports he recently had a resp infection and had severe SOB.  Feels better since treatment with Azithro/steroids.  Walking room to room at home gets SOB and d=needs O2. Reports when getting up out of bed has several SOB.  Denies CP.  Reports some LE edema as well "sometimes gets pretty bad". Follows Dr. Freddy Richardson for cards, no water pills.   A1C: 6.1 ABO:  PRA's:   PFT/JADE: 11/07/2024 FVC 2.44, 61% FEV1 0.98, 32%  12/12/2023 FVC 2.85, 66% FEV1 1.30, 38%  6MWT:  CT CHEST: 03/28/2024 Trachea and central airways are patent. Bilateral upper lobe predominant emphysematous changes. No parenchymal consolidation, pleural effusion or pneumothorax. No bronchiectasis or honeycombing. Scattered calcified granulomas bilaterally. Right middle lobe 0.2 cm nodule, possibly new from prior examination. Unchanged right apical 0.2 cm nodule.  ECHO:  RHC/LHC:

## 2025-01-03 NOTE — END OF VISIT
[FreeTextEntry3] :  Written by Jaycob Birch NP, acting as a scribe for Dr. Garces documentation recorded by the scribe accurately reflects the service I personally performed and the decisions made by me. Signature Terri Pete MD.

## 2025-01-07 NOTE — PHYSICAL EXAM
[II] : Mallampati Class: II [Normal Appearance] : normal appearance [Normal Rate/Rhythm] : normal rate/rhythm [Rhonchi] : rhonchi [No Abnormalities] : no abnormalities [Normal Gait] : normal gait [No Clubbing] : no clubbing [Normal Color/ Pigmentation] : normal color/ pigmentation [No Focal Deficits] : no focal deficits [No Sensory Deficits] : no sensory deficits [Cranial Nerves Intact] : cranial nerves intact [Oriented x3] : oriented x3 [Normal Oropharynx] : normal oropharynx [Low Lying Soft Palate] : low lying soft palate [TextBox_2] : Using accessory muscles of ventilation after walking into the office [TextBox_68] : hyperresonant note to precussion decreased breath sounds that are barely audible with a scope in assessment room [TextBox_80] : Increased AP diameter hyperinflated chest

## 2025-01-07 NOTE — END OF VISIT
[FreeTextEntry3] : As noted no PTA or APC was present everything is his evaluation at [facility] on January 6, 2025 [Time Spent: ___ minutes] : I have spent [unfilled] minutes of time on the encounter which excludes teaching and separately reported services.

## 2025-01-07 NOTE — HISTORY OF PRESENT ILLNESS
Rx faxed.    Jen Sawyer CSS     [Former] : former [TextBox_4] : HPI: 67 y/o male PMH COPD initially referred to me by Dr. Holder and I move the process forward for education and consent will move the process forward for obtaining laboratory data and moving the transplant head as he is acutely ill with shortness of breath and states that he becomes extremely dyspneic with minimal exercise uses oxygen with exertion and is actually passed out at work recently and is quite scared about the outcomes associated with his illness.NYHA class 3-4  his evaluation for lung transplant was recently opened on 12/09/25, the workup is ongoing (he has not yet done any labwork or tests).   (2 ppd for 40 years, stopped 2014), referred by Dr. Holder for transplant evaluation0 PMH: cer  When/how diagnosed with primary pulm dx?  PSH: social etoh  - smoking - drugs none Meds: Reviewed in detail on January 7, 2025 Allergies: NK       Oxygen Requirement:  *** at rest ** on exertion  *** with sleep   High Flow Nasal cannula: [ ] yes [x ] no    Working for income:   [ ] yes [ ] no     Diabetic: [ ] yes [ ] no    Insulin dependent [ ] yes [x ] no   Performs activities of daily living poorly with extreme fatigue at work and as mentioned had passed out in the past and is extremely concerned about his poor quality of life                 Occupation: Warehouse      Exposures: No exposures that would impede his lung function or cause contraindication for transplant procedure     Quality of life: Extremely poor quality of life and exercise tolerance cannot play golf cannot go shopping or carry baggage because of severe dyspnea and extreme COPD by FEV1 criteria and by CT and chest x-ray evaluation     Attends Pulmonary Rehab: N, was referred by Dr. Holder but patient states he is doing his best to attend and will make it a point to get this done Prior hospitalizations, ICU admission or intubations: none recently over the past year Hx covid infection, blood transfusions or pregnancies: None            Health maintenance/vaccines:      COVID vax:       Family History:  Social History:      Lives with:   ETOH/tobacco use:   DATA REVIEWED:   PFT/JAED:  11/07/2024 FVC 2.44, 61% FEV1 0.98, 32%  12/12/2023 FVC 2.85, 66% FEV1 1.30, 38%  6MWT:   CT CHEST:  03/28/2024 Trachea and central airways are patent. Bilateral upper lobe predominant emphysematous changes. No parenchymal consolidation, pleural effusion or pneumothorax. No bronchiectasis or honeycombing. Scattered calcified granulomas bilaterally. Right middle lobe 0.2 cm nodule, possibly new from prior examination. Unchanged right apical 0.2 cm nodule.  ECHO:   RHC/LHC:   All questions answered, used teach back method, patient verbalized understanding. [TextBox_11] : 2 [TextBox_13] : 40 [YearQuit] : 2014

## 2025-01-07 NOTE — ASSESSMENT
[FreeTextEntry1] : 67 yo copd severe requiring lung transplantation evaluation severe symptoms nyha patient class III-IV Noted progressively worsening dyspnea and exercise intolerance.  Dissatisfied with qol-he is adamant about moving the transplant process forward despite using oxygen at low levels with exertion only because of his severe subjective symptoms along with prognosis and his desire to live active lifestyle.  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 edwin Pete who is on board with moving the process forward.  We had a lengthy discussion regarding social support and is willing to care through his sisters and his immediate family although he is recently   I will see him again in approximately 2 months he will proceed with a transplant workup

## 2025-01-07 NOTE — HISTORY OF PRESENT ILLNESS
[Former] : former [TextBox_4] : HPI: 67 y/o male PMH COPD initially referred to me by Dr. Holder and I move the process forward for education and consent will move the process forward for obtaining laboratory data and moving the transplant head as he is acutely ill with shortness of breath and states that he becomes extremely dyspneic with minimal exercise uses oxygen with exertion and is actually passed out at work recently and is quite scared about the outcomes associated with his illness.NYHA class 3-4  his evaluation for lung transplant was recently opened on 12/09/25, the workup is ongoing (he has not yet done any labwork or tests).   (2 ppd for 40 years, stopped 2014), referred by Dr. Holder for transplant evaluation0 PMH: cer  When/how diagnosed with primary pulm dx?  PSH: social etoh  - smoking - drugs none Meds: Reviewed in detail on January 7, 2025 Allergies: NK       Oxygen Requirement:  *** at rest ** on exertion  *** with sleep   High Flow Nasal cannula: [ ] yes [x ] no    Working for income:   [ ] yes [ ] no     Diabetic: [ ] yes [ ] no    Insulin dependent [ ] yes [x ] no   Performs activities of daily living poorly with extreme fatigue at work and as mentioned had passed out in the past and is extremely concerned about his poor quality of life                 Occupation: Warehouse      Exposures: No exposures that would impede his lung function or cause contraindication for transplant procedure     Quality of life: Extremely poor quality of life and exercise tolerance cannot play golf cannot go shopping or carry baggage because of severe dyspnea and extreme COPD by FEV1 criteria and by CT and chest x-ray evaluation     Attends Pulmonary Rehab: N, was referred by Dr. Holder but patient states he is doing his best to attend and will make it a point to get this done Prior hospitalizations, ICU admission or intubations: none recently over the past year Hx covid infection, blood transfusions or pregnancies: None            Health maintenance/vaccines:      COVID vax:       Family History:  Social History:      Lives with:   ETOH/tobacco use:   DATA REVIEWED:   PFT/JADE:  11/07/2024 FVC 2.44, 61% FEV1 0.98, 32%  12/12/2023 FVC 2.85, 66% FEV1 1.30, 38%  6MWT:   CT CHEST:  03/28/2024 Trachea and central airways are patent. Bilateral upper lobe predominant emphysematous changes. No parenchymal consolidation, pleural effusion or pneumothorax. No bronchiectasis or honeycombing. Scattered calcified granulomas bilaterally. Right middle lobe 0.2 cm nodule, possibly new from prior examination. Unchanged right apical 0.2 cm nodule.  ECHO:   RHC/LHC:   All questions answered, used teach back method, patient verbalized understanding. [TextBox_11] : 2 [TextBox_13] : 40 [YearQuit] : 2014

## 2025-01-07 NOTE — REASON FOR VISIT
[Initial] : an initial visit [Acute] : an acute visit [COPD] : COPD [Emphysema] : emphysema [TextBox_44] : pre-transplant evaluation

## 2025-01-07 NOTE — ASSESSMENT
[FreeTextEntry1] : 67 yo copd severe requiring lung transplantation evaluation severe symptoms nyha patient class III-IV Noted progressively worsening dyspnea and exercise intolerance.  Dissatisfied with qol-he is adamant about moving the transplant process forward despite using oxygen at low levels with exertion only because of his severe subjective symptoms along with prognosis and his desire to live active lifestyle.  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 edwin Pete who is on board with moving the process forward.  We had a lengthy discussion regarding social support and is willing to care through his sisters and his immediate family although he is recently   I will see him again in approximately 2 months he will proceed with a transplant workup

## 2025-01-07 NOTE — END OF VISIT
[FreeTextEntry3] : As noted no PTA or APC was present everything is his evaluation at [facility] on January 6, 2025 [Time Spent: ___ minutes] : I have spent [unfilled] minutes of time on the encounter which excludes teaching and separately reported services.

## 2025-01-13 NOTE — DATA REVIEWED
[Cervical Spine] : cervical spine [MRI] : MRI [Lumbar Spine] : lumbar spine [Report was reviewed and noted in the chart] : The report was reviewed and noted in the chart [I independently reviewed and interpreted images and report] : I independently reviewed and interpreted images and report [I reviewed the films/CD] : I reviewed the films/CD

## 2025-01-13 NOTE — HISTORY OF PRESENT ILLNESS
[Neck] : neck [5] : 5 [Dull/Aching] : dull/aching [Throbbing] : throbbing [Intermittent] : intermittent [Household chores] : household chores [Leisure] : leisure [Sleep] : sleep [Injection therapy] : injection therapy [FreeTextEntry1] : 2025 - Patient presents for 3-week FUV. Patient reports 50% sustained relief from the previous RFA and TPI he received last visit. He continues to have some neck pain, but it is reduced in intensity. He has not participated in PT due to ongoing respiratory illness.  2024 - Patient presents for FUV after a right C3-C4, C4-C5, C5-C6 facet joint RFA on 2024.  He reports no appreciable change following the procedure.  Feels a lot of ongoing "soreness" on the right side of his neck.  He denies any radicular symptoms.  10/10/2024 - Patient presents for follow-up visit after a repeat right C3-C4, C4-C5, C5-C6 facet joint MBB on 2024.  He reports 50% reduction of pain the day of the procedure and a gradual return of pain to baseline the following day. Symptoms remain stable and unchanged at this time. Denies any changes in medical history since last visit.  24 - Patient presents for FUV after a Right C3-C4, C4-C5, C5-C6 facet joint MBB on 24. He reports greater than 80% reduction of his pain the day of the procedure followed by return to baseline by the next morning.  Positive diagnostic response.  2024 - Patient presents for FUV after a cervical C7-T1 BENIGNO on 2024. Patient reports soreness in the neck down to the mid back following the injection, after a couple days the soreness subsided.  He states that he got some reduction of pain intensity from the epidural, but he continues to have pain in the right side of the neck radiating to the right upper trapezius area and shoulder.  He did not start physical therapy as recommended.  2024 - Patient presents for FUV to review their cervical MRI from 2024. Patient continues to have pain is in the neck with radiation bilaterally to the upper trapezius area; he has subjective weakness bilaterally in the arms and hands as well as having loss of some balance and fine motor coordination.   2024 - The patient presents for initial evaluation regarding their neck pain. Patient has a long-standing history of pain, he has had injections in the past with Dr. Ojeda but had to change providers due to insurance.  Most recent injections were performed about 2-3 months ago from his recollection.  He states that injections provided very limited short-term relief. Patients pain is in the neck with radiation bilaterally to the upper trapezius area; he has subjective weakness bilaterally in the arms and hands as well as having loss of some balance and fine motor coordination.   Injections outside pain MD): 1) BENIGNO (2024) - Dr. Ojeda  Interventional pain history: 1) C7-T1 BENIGNO - (2024) 2) Right C3-C4, C4-C5, C5-C6 facet joint MBB - (24, 2024) 3) Right C3-C4, C4-C5, C5-C6 facet joint RFA (2024) 3) TPI (multiple DOS)  Pertinent Surgical History: N/A   Imagin) MRI Cervical Spine (2024) - [facility]  C2-C3 level: Mild bilateral facet arthropathy. No disc herniation. No central canal or foraminal narrowing. C3-C4 level: Broad-based posterior disc osteophyte complex and mild to moderate bilateral facet arthropathy result in moderate bilateral neural foraminal narrowing but no significant central canal narrowing. C4-C5 level:  Broad-based posterior disc bulge with superimposed right intraforaminal protrusion in conjunction with moderate bilateral facet arthropathy results in mild to moderate left neural foraminal narrowing, moderate right neural foraminal narrowing but no significant central canal narrowing. C5-C6 level: Broad-based posterior disc osteophyte complex and moderate bilateral facet arthropathy result in mild to moderate central canal narrowing, moderate right neural foraminal narrowing and mild to moderate left neural foraminal narrowing. C6-C7 level: Broad-based posterior disc bulge and moderate bilateral facet arthropathy result in moderate bilateral neural foraminal narrowing but no significant central canal narrowing. C7-T1 level: No disc herniation. No central canal or foraminal narrowing.  2) MRI Lumbar Spine (2024) - [facility]  L1-L2: Minimal disc bulging without spinal canal or foraminal narrowing. L2-L3: Moderate disc height loss with moderate disc bulging and endplate osteophyte formation. Mild foraminal narrowing. L3-L4: Moderate disc bulging with moderate disc height loss. Small endplate osteophyte formation. Moderate left and mild right foraminal narrowing. Mild lateral recess stenosis. L4-L5: Moderate disc bulging and small endplate osteophyte formation. Mild to moderate bilateral foraminal narrowing. Moderate left and mild right lateral recess stenosis. L5-S1: Moderate disc bulging with small endplate osteophyte formation. Moderate to severe bilateral foraminal narrowing  Physician Disclaimer: I have personally reviewed and confirmed all HPI data with the patient.  [] : Post Surgical Visit: no [de-identified] : Activity, driving  [de-identified] : C MRI AT [facility]

## 2025-01-13 NOTE — PROCEDURE
[Trigger point 3 or more muscle groups] : Trigger point 3 or more muscle groups [Bilateral] : bilaterally of the [Cervical paraspinal muscle] : cervical paraspinal muscle [Trapezius muscle] : trapezius muscle [Rhomboid muscle] : rhomboid muscle [Pain] : pain [Inflammation] : inflammation [Alcohol] : alcohol [Ethyl Chloride sprayed topically] : ethyl chloride sprayed topically [Sterile technique used] : sterile technique used [___ cc    0.25%] : Bupivacaine (Marcaine) ~Vcc of 0.25%  [] : Patient tolerated procedure well [Call if redness, pain or fever occur] : call if redness, pain or fever occur [Apply ice for 15min out of every hour for the next 12-24 hours as tolerated] : apply ice for 15 minutes out of every hour for the next 12-24 hours as tolerated [Risks, benefits, alternatives discussed / Verbal consent obtained] : the risks benefits, and alternatives have been discussed, and verbal consent was obtained

## 2025-01-13 NOTE — ASSESSMENT
[FreeTextEntry1] : A discussion regarding available pain management treatment options occurred with the patient.  These included interventional, rehabilitative, pharmacological, and alternative modalities. We will proceed with the following:    Interventional treatment options: - Proceed with TPI today for myofascial pain component; can repeat every 3 months or as needed basis for severe myofascial pain exacerbations - Patient s/p right C3-C4, C4-C5, C5-C6 facet joint RFA; patient was previously counseled regarding realistic expectations for interventional modalities given advanced degenerative findings - Consider interventional treatment options for lumbar spine once adequate relief of cervical - see additional instructions below    Rehabilitative options:   - Initiate trial of physical therapy for cervical spine; prescription provided today - participation in active HEP was discussed and encouraged as tolerated  Medication based treatment options:   - Continue ibuprofen 400-600 mg up to TID as needed - see additional instructions below    Complementary treatment options:   - Weight management and lifestyle modifications discussed   - Failed prior chiropractic care  Additional treatment recommendations as follows: - Previously discussed spine surgical evaluation; poor candidate due to advanced lung disease - Follow up in 3 months  We have discussed the risks, benefits, and alternatives for NSAID therapy including but not limited to the risk of bleeding, thrombosis, gastric mucosal irritation/ulceration, allergic reaction and kidney dysfunction.  The patient verbalizes an understanding.  I, Shira Duran, acting as scribe, attest that this documentation has been prepared under the direction and in the presence of Provider Carrington Valles DO.  The documentation recorded by the scribe, in my presence, accurately reflects the service I personally performed, and the decisions made by me with my edits as appropriate.

## 2025-01-13 NOTE — PHYSICAL EXAM
[de-identified] : Constitutional:   - No acute distress   - Well developed; well nourished    Neurological:   - normal mood and affect   - alert and oriented x 3     Cardiovascular:   - grossly normal   Cervical Spine Exam:   Inspection:   erythema (-)   ecchymosis (-)   rashes (-)    Palpation:                                                    Cervical paraspinal tenderness:         R (+); L (+)  Upper trapezius tenderness:              R (+); L (+)  Rhomboids tenderness:                     R (+); L (+)  Occipital Ridge:                                  R (-); L (-)  Supraspinatus tenderness:                 R (-); L (-)   ROM: WNL; stiffness with extremes of flexion/extension and bilateral rotation pain with extremes of bilateral rotation  Strength Testing:              Deltoid                           R (5/5); L (5/5)  Biceps:                          R (5/5); L (5/5)  Triceps:                         R (5/5); L (5/5)  Finger Abductors:         R (5/5); L (5/5)  Grasp:                           R (5/5); L (5/5)   Special Testing:  Spurling Test:                  R (-); L (-)  Facet load test:               R (+); L (+)  Avila's Sign:               R (-); L (-)   Neuro:  SILT throughout right upper extremity  SILT throughout left upper extremity   Reflexes:  Biceps   -           R (2+); L (2+)  Triceps  -           R (2+); L (2+)  Brachioradialis- R (2+); L (2+)     No ankle clonus   _____________________________________________________  Lumbar Spine Exam:  Inspection: erythema (-) ecchymosis (-) rashes (-) alignment: no scoliosis  Palpation: Midline lumbar tenderness:             (-) midline thoracic tenderness:          (-) Lumbar paraspinal tenderness:      L (+) ; R (+) thoracic paraspinal tenderness:    L (-) ; R (-) sciatic nerve tenderness :             L (-) ; R (-) SI joint tenderness:                        L (-) ; R (-) GTB tenderness:                            L (-);  R (-)  ROM: restricted all planes pain with extremes of flexion/extension  Strength:                                    Right       Left    Hip Flexion:                (5/5)       (5/5) Quadriceps:               (5/5)       (5/5) Hamstrings:                (5/5)       (5/5) Ankle Dorsiflexion:     (5/5)       (5/5) EHL:                           (5/5)       (5/5) Ankle Plantarflexion:  (5/5)       (5/5)  Special Tests: SLR:                           R (-) ; L (-) Facet loading:            R (+) ; L (+) JUDI test:               R (n/a) ; L (n/a) Hamstring tightness:  R (+);  L (+)  Neurologic: SI LT throughout right lower extremity SI LT throughout left lower extremity  Reflexes normal and symmetric bilateral lower extremities  Gait:  mildly antalgic gait ambulates without assistive device

## 2025-02-07 NOTE — HISTORY OF PRESENT ILLNESS
[FreeTextEntry1] : 67-year-old male who presents for consultation for hemorrhoids.  He reports perianal skin tags that make it difficult to keep the area clean.  Take cause occasional discomfort and irritation.  He rarely has bleeding from his hemorrhoid.  He has undergone hemorrhoidectomy over 20 years ago.  Otherwise he reports regular bowel movements without difficulty.  No constipation or diarrhea.    He is being evaluated for lung transplant and wanted to address his hemorrhoids prior to this surgery.  Last colonoscopy was about 6 years ago and had colon polyps.  He is overdue based on their recommended timeframe.

## 2025-02-07 NOTE — PHYSICAL EXAM
[None] : there was no rectal mass  [Respiratory Effort] : normal respiratory effort [Normal Rate and Rhythm] : normal rate and rhythm [Calm] : calm [Excoriation] : no perianal excoriation [Gross Blood] : no gross blood [Carotid Bruits] : no carotid bruits [de-identified] : Soft, nontender, nondistended.  No mass or hernias appreciated [de-identified] : Grade 1 internal hemorrhoids [de-identified] : Nonthrombosed external hemorrhoid skin tags [de-identified] : Well-appearing, in no distress [de-identified] : Normocephalic, atraumatic [de-identified] : Moves extremities without difficulty [de-identified] : Warm and dry [de-identified] : Alert and oriented x 3

## 2025-02-07 NOTE — PLAN
[TextEntry] : 67-year-old male with primarily external hemorrhoids causing intermittent symptoms.  I recommend nonoperative management with hydrocortisone cream when the hemorrhoids flareup.  Do not recommend pursuing surgery for hemorrhoidectomy.  High-fiber diet.  Follow-up as needed and he will follow-up with his gastroenterologist for colonoscopy

## 2025-02-17 NOTE — HISTORY OF PRESENT ILLNESS
[FreeTextEntry1] : Patient is now being evaluated for a possible lung transplant.  He is in the process of this evaluation having completed numerous rounds of testing at [facility] including right and left heart cardiac catheterization, echocardiography, lower extremity arterial and venous duplex upper extremity arterial duplex and carotid duplex.  He states that what brought him to this point is that his quality of life has substantially deteriorated in the last 1 to 2 years such that he cannot even attend to basic activities of daily living without having significant dyspnea and limitation. Does not check his blood pressures at home.  Has chronic bilateral pretibial and ankle edema.

## 2025-02-17 NOTE — ASSESSMENT
[FreeTextEntry1] :  EKG: Sinus rhythm at 77 bpm, low voltage, no significant ST or T wave abnormalities.   ECG obtained to assist in diagnosis and management of assessed problem(s).  Laboratory data: ---4/27/2022--3/31/2022--4/15/2021--12/9/2019--3/31/22--4/6/22-----5/23/22--12/9/24 Creat-2.34-----1.72------------ 1.45------ 1.23----------------------2.34-------1.45-------1.24 Chol------------------------------- 215-------------------267-------------------------------------190 HDL-------------------------------- 81--------------------99---------------------------------------113 LDL------------------------------- 134-------------------141---------------------------------------60 Y0d--------------------------------3.6  Echocardiogram ([facility]) 1/30/2025 Normal LV size and function LVEF 57% No pericardial effusion Normal right sided chamber size and function Estimated pulmonary artery pressure 34 mmHg.  Echocardiogram 5/22/23: Normal LVEF 55-60%' Mildly dilated ascending aorta measuring 4.2 cm  Echocardiogram  1/23/2020 Normal LV size and function EF 55 to 60% No LVH No evidence of pulmonary hypertension No significant valvular disease.  Echocardiogram  11/16/18 normal left ventricular size and function. mild pulmonary hypertension (40.5 mmHg).  There is mild dilatation of the ascending aorta 4 cm, which is unchanged in comparison with the prior study.  Cardiac catheterization 2/6/2025 Right heart cath: RA: 10 PA P: 42/21, mean 29 PCW 12 PVR: 3.06 Woods unit w/u Coronaries: Diffuse minor luminal irregularities no significant disease.  Lower extremity arterial duplex: 1/21/2025: No significant obstructive disease.  Upper extremity arterial duplex 1/21/2025 No significant obstructive disease  Lower extremity venous duplex: No evidence of DVT  Carotid duplex 1/21/2025 minor atheromatous disease  Impression:  1.  Based on progression of symptoms, a lung transplant evaluation is now in progress and testing is as per above.  2.  Blood pressure well controlled on Diltiazem 240 mg BID. Echocardiogram showed proximal ascending aorta measuring 4.2 cm, no change in aortic dimensions compared to prior study. No evidence of pulmonary hypertension.   3. He remains smoke-free. Quit smoking 10 years ago after an 80-pack year history. Sees pulmonary regularly.   4.  Tolerating Rosuvastatin 10 mg for HLD.  Most recent lipid panel shows good control.  5.  There is diffuse mild atheromatous disease seen both in his coronaries as well as peripheral arterial vasculature but no high-grade stenoses.  Plan: 1.  Continue Diltiazem 240 mg BID.   2.  Obtain home BP machine. Monitor blood pressure daily and contact us if it does not stay in 130/80 or below range. Avoid salt in his diet.   3.  Follow-up lipid panel and CMP. Continue Rosuvastatin. Follow a low-fat, low-carbohydrate diet.   4.  Patient proceeding with transplant evaluation.      No change in management from cardiology perspective.  Pt knows to call if any new or worsening symptoms. In the absence of any cardiac associated symptoms clinical follow up can be made in 6 months.   EKG obtained to assist in diagnosis and management of assessed problem(s).

## 2025-02-17 NOTE — HISTORY OF PRESENT ILLNESS
[FreeTextEntry1] : Patient is now being evaluated for a possible lung transplant.  He is in the process of this evaluation having completed numerous rounds of testing at [facility] including right and left heart cardiac catheterization, echocardiography, lower extremity arterial and venous duplex upper extremity arterial duplex and carotid duplex.  He states that what brought him to this point is that his quality of life has substantially deteriorated in the last 1 to 2 years such that he cannot even attend to basic activities of daily living without having significant dyspnea and limitation. Does not check his blood pressures at home.  Has chronic bilateral pretibial and ankle edema.

## 2025-02-17 NOTE — PHYSICAL EXAM
[Well Developed] : well developed [Well Nourished] : well nourished [No Acute Distress] : no acute distress [Normal Conjunctiva] : normal conjunctiva [Normal Venous Pressure] : normal venous pressure [No Carotid Bruit] : no carotid bruit [Normal S1, S2] : normal S1, S2 [No Rub] : no rub [No Gallop] : no gallop [Murmur] : murmur [Clear Lung Fields] : clear lung fields [Good Air Entry] : good air entry [No Respiratory Distress] : no respiratory distress  [Soft] : abdomen soft [Non Tender] : non-tender [No Masses/organomegaly] : no masses/organomegaly [Normal Bowel Sounds] : normal bowel sounds [Normal Gait] : normal gait [No Cyanosis] : no cyanosis [No Clubbing] : no clubbing [No Varicosities] : no varicosities [Edema ___] : edema [unfilled] [No Rash] : no rash [No Skin Lesions] : no skin lesions [Moves all extremities] : moves all extremities [No Focal Deficits] : no focal deficits [Normal Speech] : normal speech [Alert and Oriented] : alert and oriented [Normal memory] : normal memory [de-identified] : I/VI systolic murmur

## 2025-02-17 NOTE — PHYSICAL EXAM
[Well Developed] : well developed [Well Nourished] : well nourished [No Acute Distress] : no acute distress [Normal Conjunctiva] : normal conjunctiva [Normal Venous Pressure] : normal venous pressure [No Carotid Bruit] : no carotid bruit [Normal S1, S2] : normal S1, S2 [No Rub] : no rub 1U PRBC [No Gallop] : no gallop [Murmur] : murmur [Clear Lung Fields] : clear lung fields [Good Air Entry] : good air entry [No Respiratory Distress] : no respiratory distress  [Soft] : abdomen soft [Non Tender] : non-tender [No Masses/organomegaly] : no masses/organomegaly [Normal Bowel Sounds] : normal bowel sounds [Normal Gait] : normal gait [No Cyanosis] : no cyanosis [No Clubbing] : no clubbing [No Varicosities] : no varicosities [Edema ___] : edema [unfilled] [No Rash] : no rash [No Skin Lesions] : no skin lesions [Moves all extremities] : moves all extremities [No Focal Deficits] : no focal deficits [Normal Speech] : normal speech [Alert and Oriented] : alert and oriented [Normal memory] : normal memory [de-identified] : I/VI systolic murmur

## 2025-02-17 NOTE — REASON FOR VISIT
[FreeTextEntry1] : EMERALD RABAGO is a 67 year-old  M presents here for cardiac follow-up. His medical history includes:  1.  Dilated thoracic aorta.  2.  Emphysema.  3.  Chronic baseline exertional dyspnea.  4.  HTN  5.  PAH   HTN Management: Patient presented several times with elevated blood pressure. Lisinopril increased to 10 mg BID and Hydrochlorothiazide 12.5 mg added.  Subsequent blood pressures were running 110-120/60-70.  Lisinopril and HCTZ were discontinued due to creatinine elevation.  He was started on Diltiazem 240 mg daily.  On 6/20/2022 stated that average blood pressure 134/84 on diltiazem 240 a.m. and 120 PM. Based on this we increased him to diltiazem 240 twice daily.

## 2025-02-17 NOTE — ASSESSMENT
[FreeTextEntry1] :  EKG: Sinus rhythm at 77 bpm, low voltage, no significant ST or T wave abnormalities.   ECG obtained to assist in diagnosis and management of assessed problem(s).  Laboratory data: ---4/27/2022--3/31/2022--4/15/2021--12/9/2019--3/31/22--4/6/22-----5/23/22--12/9/24 Creat-2.34-----1.72------------ 1.45------ 1.23----------------------2.34-------1.45-------1.24 Chol------------------------------- 215-------------------267-------------------------------------190 HDL-------------------------------- 81--------------------99---------------------------------------113 LDL------------------------------- 134-------------------141---------------------------------------60 R3l--------------------------------2.6  Echocardiogram ([facility]) 1/30/2025 Normal LV size and function LVEF 57% No pericardial effusion Normal right sided chamber size and function Estimated pulmonary artery pressure 34 mmHg.  Echocardiogram 5/22/23: Normal LVEF 55-60%' Mildly dilated ascending aorta measuring 4.2 cm  Echocardiogram  1/23/2020 Normal LV size and function EF 55 to 60% No LVH No evidence of pulmonary hypertension No significant valvular disease.  Echocardiogram  11/16/18 normal left ventricular size and function. mild pulmonary hypertension (40.5 mmHg).  There is mild dilatation of the ascending aorta 4 cm, which is unchanged in comparison with the prior study.  Cardiac catheterization 2/6/2025 Right heart cath: RA: 10 PA P: 42/21, mean 29 PCW 12 PVR: 3.06 Woods unit w/u Coronaries: Diffuse minor luminal irregularities no significant disease.  Lower extremity arterial duplex: 1/21/2025: No significant obstructive disease.  Upper extremity arterial duplex 1/21/2025 No significant obstructive disease  Lower extremity venous duplex: No evidence of DVT  Carotid duplex 1/21/2025 minor atheromatous disease  Impression:  1.  Based on progression of symptoms, a lung transplant evaluation is now in progress and testing is as per above.  2.  Blood pressure well controlled on Diltiazem 240 mg BID. Echocardiogram showed proximal ascending aorta measuring 4.2 cm, no change in aortic dimensions compared to prior study. No evidence of pulmonary hypertension.   3. He remains smoke-free. Quit smoking 10 years ago after an 80-pack year history. Sees pulmonary regularly.   4.  Tolerating Rosuvastatin 10 mg for HLD.  Most recent lipid panel shows good control.  5.  There is diffuse mild atheromatous disease seen both in his coronaries as well as peripheral arterial vasculature but no high-grade stenoses.  Plan: 1.  Continue Diltiazem 240 mg BID.   2.  Obtain home BP machine. Monitor blood pressure daily and contact us if it does not stay in 130/80 or below range. Avoid salt in his diet.   3.  Follow-up lipid panel and CMP. Continue Rosuvastatin. Follow a low-fat, low-carbohydrate diet.   4.  Patient proceeding with transplant evaluation.      No change in management from cardiology perspective.  Pt knows to call if any new or worsening symptoms. In the absence of any cardiac associated symptoms clinical follow up can be made in 6 months.   EKG obtained to assist in diagnosis and management of assessed problem(s).

## 2025-02-17 NOTE — ASSESSMENT
[FreeTextEntry1] :  EKG: Sinus rhythm at 77 bpm, low voltage, no significant ST or T wave abnormalities.   ECG obtained to assist in diagnosis and management of assessed problem(s).  Laboratory data: ---4/27/2022--3/31/2022--4/15/2021--12/9/2019--3/31/22--4/6/22-----5/23/22--12/9/24 Creat-2.34-----1.72------------ 1.45------ 1.23----------------------2.34-------1.45-------1.24 Chol------------------------------- 215-------------------267-------------------------------------190 HDL-------------------------------- 81--------------------99---------------------------------------113 LDL------------------------------- 134-------------------141---------------------------------------60 W7n--------------------------------3.6  Echocardiogram ([facility]) 1/30/2025 Normal LV size and function LVEF 57% No pericardial effusion Normal right sided chamber size and function Estimated pulmonary artery pressure 34 mmHg.  Echocardiogram 5/22/23: Normal LVEF 55-60%' Mildly dilated ascending aorta measuring 4.2 cm  Echocardiogram  1/23/2020 Normal LV size and function EF 55 to 60% No LVH No evidence of pulmonary hypertension No significant valvular disease.  Echocardiogram  11/16/18 normal left ventricular size and function. mild pulmonary hypertension (40.5 mmHg).  There is mild dilatation of the ascending aorta 4 cm, which is unchanged in comparison with the prior study.  Cardiac catheterization 2/6/2025 Right heart cath: RA: 10 PA P: 42/21, mean 29 PCW 12 PVR: 3.06 Woods unit w/u Coronaries: Diffuse minor luminal irregularities no significant disease.  Lower extremity arterial duplex: 1/21/2025: No significant obstructive disease.  Upper extremity arterial duplex 1/21/2025 No significant obstructive disease  Lower extremity venous duplex: No evidence of DVT  Carotid duplex 1/21/2025 minor atheromatous disease  Impression:  1.  Based on progression of symptoms, a lung transplant evaluation is now in progress and testing is as per above.  2.  Blood pressure well controlled on Diltiazem 240 mg BID. Echocardiogram showed proximal ascending aorta measuring 4.2 cm, no change in aortic dimensions compared to prior study. No evidence of pulmonary hypertension.   3. He remains smoke-free. Quit smoking 10 years ago after an 80-pack year history. Sees pulmonary regularly.   4.  Tolerating Rosuvastatin 10 mg for HLD.  Most recent lipid panel shows good control.  5.  There is diffuse mild atheromatous disease seen both in his coronaries as well as peripheral arterial vasculature but no high-grade stenoses.  Plan: 1.  Continue Diltiazem 240 mg BID.   2.  Obtain home BP machine. Monitor blood pressure daily and contact us if it does not stay in 130/80 or below range. Avoid salt in his diet.   3.  Follow-up lipid panel and CMP. Continue Rosuvastatin. Follow a low-fat, low-carbohydrate diet.   4.  Patient proceeding with transplant evaluation.      No change in management from cardiology perspective.  Pt knows to call if any new or worsening symptoms. In the absence of any cardiac associated symptoms clinical follow up can be made in 6 months.   EKG obtained to assist in diagnosis and management of assessed problem(s).

## 2025-02-17 NOTE — PHYSICAL EXAM
[Well Developed] : well developed [Well Nourished] : well nourished [No Acute Distress] : no acute distress [Normal Conjunctiva] : normal conjunctiva [Normal Venous Pressure] : normal venous pressure [No Carotid Bruit] : no carotid bruit [Normal S1, S2] : normal S1, S2 [No Rub] : no rub [No Gallop] : no gallop [Murmur] : murmur [Clear Lung Fields] : clear lung fields [Good Air Entry] : good air entry [No Respiratory Distress] : no respiratory distress  [Soft] : abdomen soft [Non Tender] : non-tender [No Masses/organomegaly] : no masses/organomegaly [Normal Bowel Sounds] : normal bowel sounds [Normal Gait] : normal gait [No Cyanosis] : no cyanosis [No Clubbing] : no clubbing [No Varicosities] : no varicosities [Edema ___] : edema [unfilled] [No Rash] : no rash [No Skin Lesions] : no skin lesions [Moves all extremities] : moves all extremities [No Focal Deficits] : no focal deficits [Normal Speech] : normal speech [Alert and Oriented] : alert and oriented [Normal memory] : normal memory [de-identified] : I/VI systolic murmur

## 2025-02-17 NOTE — REVIEW OF SYSTEMS
[Lower Ext Edema] : lower extremity edema [Joint Pain] : joint pain [Negative] : Neurological [Weight Gain (___ Lbs)] : [unfilled] ~Ulb weight gain [FreeTextEntry6] : chronic PINA

## 2025-03-03 NOTE — END OF VISIT
[Time Spent: ___ minutes] : I have spent [unfilled] minutes of time on the encounter which excludes teaching and separately reported services. [FreeTextEntry3] : As above. Patient seen and examined. 67 year old male with chronic hypoxic respiratory failure secondary to severe COPD, last exacerbation 11/2024) presents for pre-lung transplant evaluation follow up. Using 4L with exertion. Able to maintain adequate SpO2 at rest on RA. With even minimal exertion becomes very dyspneic however and reports poor quality of life as a result. Work up ongoing. Barrier to transplant at this time includes active drinking. Patient is working on reducing alcohol intake. Explained that a plan needs to include abstinence in order to proceed. Will need to be revisited at next visit. Remainder of plan as a detailed above. Blood work drawn today. PFTs reviewed.

## 2025-03-03 NOTE — DISCUSSION/SUMMARY
[FreeTextEntry1] : 66 y/o male PMH COPD, emphysema, presenting to clinic for follow up evaluation for lung transplant.   #LungTransplantEvaluation - financial clearance pending - open evaluation since 12/09/2024  - Workup as below: - PFTs/spirometry, 03/03/2025 - EOT, 02/26/2025 - 6MWT w/ Karnofsky Scoring, 02/26/2025 - PA/Lat CXR, 01/21/2025 - CT chest, 01/23/2025 - CT Abd/Pelvis, 01/23/2025 - Quant NM VQ, 02/13/2025 - EKG, 02/17/2025 - TTE w/ bubble study, 01/30/2025 - LHC/RHC, 02/06/2025 - HODAN, 02/13/2025 - Upper/lower venous/arterial duplexes and carotid doppler, 01/21/2025 - DEXA, 01/21/2025 - BaS (esophagram), 02/13/2025 - Fluroscopy of diaphragm, 02/13/2025 - CT colonography, 01/23/2025   - derm clearance, pending - spoke to patient about this today, will do locally - dental clearance, pending - spoke to patient about this today, will do locally - surgical clearance, 01/03/2025 - infectious disease clearance, pending 03/17/2025 - SW clearance, 01/03/2025, pending additional follow up - psychiatry clearance, seen 02/19/2025, awaiting note - dietician clearance, pending reschedule   - evaluation labwork pending, notable findings include: - n/a, patient was given Rx today to go to local lab later this week to get done    Follow-ups: - pending completion of the outstanding tests, labwork, and clearances as above - PRAs and ABO drawn today, 03/03/2025 - likely SCM presentation date in 04/2025 or 05/2025   RTC in 4-6 weeks with primary transplant pulmonologist Dr. Hines    Above discussed with attending physician Dr. Hines. All questions answered, used teach back method, patient verbalized understanding.

## 2025-03-03 NOTE — PHYSICAL EXAM
[Normal Oropharynx] : normal oropharynx [Low Lying Soft Palate] : low lying soft palate [II] : Mallampati Class: II [Normal Appearance] : normal appearance [Normal Rate/Rhythm] : normal rate/rhythm [Rhonchi] : rhonchi [Normal Gait] : normal gait [No Clubbing] : no clubbing [Normal Color/ Pigmentation] : normal color/ pigmentation [No Focal Deficits] : no focal deficits [No Sensory Deficits] : no sensory deficits [Cranial Nerves Intact] : cranial nerves intact [Oriented x3] : oriented x3 [Wheeze] : wheeze [TextBox_2] : Using accessory muscles of ventilation after walking into the office [TextBox_68] : hyperresonant note to precussion decreased breath sounds that are barely audible with a scope in assessment room [TextBox_80] : Increased AP diameter hyperinflated chest

## 2025-03-03 NOTE — HISTORY OF PRESENT ILLNESS
[Former] : former [TextBox_4] : HPI: 65 y/o male PMH COPD initially referred to me by Dr. Holder and I move the process forward for education and consent will move the process forward for obtaining laboratory data and moving the transplant head as he is acutely ill with shortness of breath and states that he becomes extremely dyspneic with minimal exercise uses oxygen with exertion and is actually passed out at work recently and is quite scared about the outcomes associated with his illness.NYHA class 3-4  Clinic 03/03/2025: reports respiratory exacerbation 11/2024, defined by feeling that his "lungs were heavy with mucus". no recent medication changes. still with EtOH 12-15 drinks/week, favors bourbon. his support person plan is to live with his friend José post-transplant.   PMH: chronic lower back/neck pain. s/p multiple epidurals/injections for treatment. is f/b pain management. hemorrhoids, f/b colorectal.  [place] - uses bilat hearing aids  When/how diagnosed with primary pulm dx?  PSH: hemorrhoidectomy > 20 years ago, abdominal hernia repair with mesh 2024 - [facility],   Social: retired/disability Allergies: NKDA   Oxygen Requirement:  RA at rest, 4lpm NC on exertion,  RA with sleep   High Flow Nasal cannula: [ ] yes [x ] no    Working for income:   [ ] yes [ ] no    Diabetic: [ ] yes [ x] no    Insulin dependent [ ] yes [x ] no   Performs activities of daily living poorly with extreme fatigue at work and as mentioned had passed out in the past and is extremely concerned about his poor quality of life       Occupation: [employer]   Exposures: no exposures that would impede his lung function or cause contraindication for transplant procedure  Quality of life: Extremely poor quality of life and exercise tolerance cannot play golf cannot go shopping or carry baggage because of severe dyspnea and extreme COPD by FEV1 criteria and by CT and chest x-ray evaluation  Attends Pulmonary Rehab: N, was referred by Dr. Holder and patient verbalizes today a re-commitment to attending  Prior hospitalizations, ICU admission or intubations: none recently over the past year Hx covid infection, blood transfusions or pregnancies: None  Health maintenance/vaccines:   COVID vax:   Family History:  Social History:    Lives with: alone ETOH/tobacco use: former smoker, 2 ppd for 4 years and stopped 2014, daily etoh - approx 10-15 drinks/week DATA REVIEWED:   PFT/JADE:  03/03/25 FVC 2.88, 70% FEV1, 1.25, 40% TLC, 7.60, 106% DLCO, 10.58, 40%  11/07/2024 FVC 2.44, 61% FEV1 0.98, 32%  12/12/2023 FVC 2.85, 66% FEV1 1.30, 38%  6MWT:  02/26/25 - 366 meters on 3lpm  CT CHEST:  03/28/2024 Trachea and central airways are patent. Bilateral upper lobe predominant emphysematous changes. No parenchymal consolidation, pleural effusion or pneumothorax. No bronchiectasis or honeycombing. Scattered calcified granulomas bilaterally. Right middle lobe 0.2 cm nodule, possibly new from prior examination. Unchanged right apical 0.2 cm nodule.  ECHO:  01/30/2025  1. Left ventricular cavity is normal in size. Left ventricular wall thickness is normal. Left ventricular systolic function is normal with an ejection fraction of 57 % by Steel's method of disks. There are no regional wall motion abnormalities seen.  2. Normal left ventricular diastolic function, with normal left ventricular filling pressure.  3. Normal right ventricular cavity size, with normal wall thickness, and normal right ventricular systolic function.  4. No pericardial effusion seen.  5. No prior echocardiogram is available for comparison.  6. There is no evidence of a left ventricular thrombus.  7. There is normal LV mass and normal geometry.  RHC/LHC:  Coronary AngiographyThe coronary circulation is right dominant.  LMLeft main artery: Angiography shows no disease.  LADLeft anterior descending artery: Angiography shows minor irregularities.  CXCircumflex: Angiography shows no disease.  RCARight coronary artery: Angiography shows minor irregularities.  All questions answered, used teach back method, patient verbalized understanding. [TextBox_11] : 2 [TextBox_13] : 40 [YearQuit] : 2014

## 2025-03-03 NOTE — REASON FOR VISIT
[Acute] : an acute visit [COPD] : COPD [Emphysema] : emphysema [TextBox_44] : pre-transplant evaluation

## 2025-03-03 NOTE — DISCUSSION/SUMMARY
[FreeTextEntry1] : 68 y/o male PMH COPD, emphysema, presenting to clinic for follow up evaluation for lung transplant.   #LungTransplantEvaluation - financial clearance pending - open evaluation since 12/09/2024  - Workup as below: - PFTs/spirometry, 03/03/2025 - EOT, 02/26/2025 - 6MWT w/ Karnofsky Scoring, 02/26/2025 - PA/Lat CXR, 01/21/2025 - CT chest, 01/23/2025 - CT Abd/Pelvis, 01/23/2025 - Quant NM VQ, 02/13/2025 - EKG, 02/17/2025 - TTE w/ bubble study, 01/30/2025 - LHC/RHC, 02/06/2025 - HODAN, 02/13/2025 - Upper/lower venous/arterial duplexes and carotid doppler, 01/21/2025 - DEXA, 01/21/2025 - BaS (esophagram), 02/13/2025 - Fluroscopy of diaphragm, 02/13/2025 - CT colonography, 01/23/2025   - derm clearance, pending - spoke to patient about this today, will do locally - dental clearance, pending - spoke to patient about this today, will do locally - surgical clearance, 01/03/2025 - infectious disease clearance, pending 03/17/2025 - SW clearance, 01/03/2025, pending additional follow up - psychiatry clearance, seen 02/19/2025, awaiting note - dietician clearance, pending reschedule   - evaluation labwork pending, notable findings include: - n/a, patient was given Rx today to go to local lab later this week to get done    Follow-ups: - pending completion of the outstanding tests, labwork, and clearances as above - PRAs and ABO drawn today, 03/03/2025 - likely SCM presentation date in 04/2025 or 05/2025   RTC in 4-6 weeks with primary transplant pulmonologist Dr. Hines    Above discussed with attending physician Dr. Hines. All questions answered, used teach back method, patient verbalized understanding.

## 2025-03-17 NOTE — HISTORY OF PRESENT ILLNESS
[FreeTextEntry1] : 66-year-old male with past medical history of COPD, abdominal hernia repair with mesh in 2024 who presents to transplant infectious disease office for pretransplant evaluation.  Patient denies any chills or fevers.  Patient denies any unintentional weight loss.  Patient notes cough and dyspnea on exertion. [] : No [de-identified] : [place] the [place]. [de-identified] : [place]. [de-identified] : [place].  Has lived in [place], [place], [place], [place], [place], [place] [de-identified] :  for 38 years [de-identified] : Has had dogs and cats in the past.  Denies any livestock exposure.  No unusual hobbies. [de-identified] : Has had a remote episode of COVID-19. Has had 1 episode of walking pneumonia. No episodes of sinusitis No recurring UTI No skin or soft tissue infections No wounds [de-identified] : No STD or STI

## 2025-03-17 NOTE — HISTORY OF PRESENT ILLNESS
[FreeTextEntry1] : 66-year-old male with past medical history of COPD, abdominal hernia repair with mesh in 2024 who presents to transplant infectious disease office for pretransplant evaluation.  Patient denies any chills or fevers.  Patient denies any unintentional weight loss.  Patient notes cough and dyspnea on exertion. [] : No [de-identified] : Born [place] the [place]. [de-identified] : [place] past. [de-identified] : Has [place] travel [place] including [place].  Has lived in [place], [place], [place], [place], [place], [place] [de-identified] :  for 38 years [de-identified] : Has had dogs and cats in the past.  Denies any livestock exposure.  No unusual hobbies. [de-identified] : Has had a remote episode of COVID-19. Has had 1 episode of walking pneumonia. No episodes of sinusitis No recurring UTI No skin or soft tissue infections No wounds [de-identified] : No STD or STI

## 2025-03-17 NOTE — PHYSICAL EXAM
[General Appearance - Alert] : alert [General Appearance - In No Acute Distress] : in no acute distress [Sclera] : the sclera and conjunctiva were normal [Outer Ear] : the ears and nose were normal in appearance [Neck Appearance] : the appearance of the neck was normal [Neck Cervical Mass (___cm)] : no neck mass was observed [Jugular Venous Distention Increased] : there was no jugular-venous distention [Thyroid Diffuse Enlargement] : the thyroid was not enlarged [FreeTextEntry1] : Mild end expiratory wheezes, no rales [Heart Rate And Rhythm] : heart rate was normal and rhythm regular [Heart Sounds] : normal S1 and S2 [Heart Sounds Gallop] : no gallops [Murmurs] : no murmurs [Heart Sounds Pericardial Friction Rub] : no pericardial rub [Edema] : there was no peripheral edema [Bowel Sounds] : normal bowel sounds [Abdomen Soft] : soft [Abdomen Tenderness] : non-tender [] : no hepato-splenomegaly [Abdomen Mass (___ Cm)] : no abdominal mass palpated [Costovertebral Angle Tenderness] : no CVA tenderness [No Palpable Adenopathy] : no palpable adenopathy [Musculoskeletal - Swelling] : no joint swelling [Nail Clubbing] : no clubbing  or cyanosis of the fingernails [Motor Tone] : muscle strength and tone were normal [Affect] : the affect was normal

## 2025-03-17 NOTE — REVIEW OF SYSTEMS
[Shortness Of Breath] : shortness of breath [Wheezing] : wheezing [Cough] : cough [SOB on Exertion] : shortness of breath during exertion [Sputum] : not coughing up ~M sputum [Pleuritic Chest Pain] : no pleuritic chest pain [Negative] : Heme/Lymph

## 2025-04-07 NOTE — HISTORY OF PRESENT ILLNESS
[TextBox_4] : HPI: 65 y/o male PMH COPD, referral for lung transplant evaluation. currently open evaluation.  NYHA class 3-4  Clinic 03/03/2025: reports respiratory exacerbation 11/2024, defined by feeling that his "lungs were heavy with mucus". no recent medication changes. still with EtOH 12-15 drinks/week, favors bolove. his support person plan is to live with his friend José post-transplant.   Clinic 04/07/2025: no recent respiratory exacerbations. reports 3-4 EtOH drinks/day. Discussed AA with Dr. Keyes during consult, but patient thinks he is more amenable to 1:1 counseling. Has follow up appointment with dentist to discuss treatment of chronic infection.   PMH: COPD, chronic lower back/neck pain. s/p multiple epidurals/injections for treatment. is f/b pain management. hemorrhoids, f/b colorectal.  [place] - uses bilat hearing aids PSH: hemorrhoidectomy > 20 years ago, abdominal hernia repair with mesh 2024 - [facility],   Social: retired/disability Allergies: NKDA   Oxygen Requirement:  RA-2lpm NC at rest, 4lpm NC on exertion, RA with sleep   High Flow Nasal cannula: [ ] yes [x ] no    Working for income:   [ ] yes [ ] no    Diabetic: [ ] yes [ x] no    Insulin dependent [ ] yes [x ] no   Performs activities of daily living poorly with extreme fatigue at work and as mentioned had passed out in the past and is extremely concerned about his poor quality of life       Occupation: [employer]  Karnofsky score: ***  Exposures: no exposures that would impede his lung function or cause contraindication for transplant procedure  Quality of life: Extremely poor quality of life and exercise tolerance cannot play golf cannot go shopping or carry baggage because of severe dyspnea and extreme COPD by FEV1 criteria and by CT and chest x-ray evaluation  Attends Pulmonary Rehab: N, was referred by Dr. Holder and patient verbalizes today a re-commitment to attending  Prior hospitalizations, ICU admission or intubations: none recently over the past year Hx covid infection, blood transfusions or pregnancies: None  Health maintenance/vaccines:   COVID vax:   Family History:  Social History:    Lives with: alone ETOH/tobacco use: former smoker, 2 ppd for 4 years and stopped 2014, daily etoh - approx 10-15 drinks/week DATA REVIEWED:   PFT/JADE:  03/03/25 FVC 2.88, 70% FEV1, 1.25, 40% TLC, 7.60, 106% DLCO, 10.58, 40%  11/07/2024 FVC 2.44, 61% FEV1 0.98, 32%  12/12/2023 FVC 2.85, 66% FEV1 1.30, 38%  6MWT:  02/26/25 - 366 meters on 3lpm  CT CHEST:  03/28/2024 Trachea and central airways are patent. Bilateral upper lobe predominant emphysematous changes. No parenchymal consolidation, pleural effusion or pneumothorax. No bronchiectasis or honeycombing. Scattered calcified granulomas bilaterally. Right middle lobe 0.2 cm nodule, possibly new from prior examination. Unchanged right apical 0.2 cm nodule.  ECHO:  01/30/2025  1. Left ventricular cavity is normal in size. Left ventricular wall thickness is normal. Left ventricular systolic function is normal with an ejection fraction of 57 % by Steel's method of disks. There are no regional wall motion abnormalities seen.  2. Normal left ventricular diastolic function, with normal left ventricular filling pressure.  3. Normal right ventricular cavity size, with normal wall thickness, and normal right ventricular systolic function.  4. No pericardial effusion seen.  5. No prior echocardiogram is available for comparison.  6. There is no evidence of a left ventricular thrombus.  7. There is normal LV mass and normal geometry.  RHC/LHC:  Coronary AngiographyThe coronary circulation is right dominant.  LMLeft main artery: Angiography shows no disease.  LADLeft anterior descending artery: Angiography shows minor irregularities.  CXCircumflex: Angiography shows no disease.  RCARight coronary artery: Angiography shows minor irregularities.  All questions answered, used teach back method, patient verbalized understanding. [TextBox_11] : 2 [TextBox_13] : 40 [YearQuit] : 2014

## 2025-04-07 NOTE — HISTORY OF PRESENT ILLNESS
[TextBox_4] : HPI: 65 y/o male PMH COPD, referral for lung transplant evaluation. currently open evaluation.  NYHA class 3-4  Clinic 03/03/2025: reports respiratory exacerbation 11/2024, defined by feeling that his "lungs were heavy with mucus". no recent medication changes. still with EtOH 12-15 drinks/week, favors bolove. his support person plan is to live with his friend José post-transplant.   Clinic 04/07/2025: no recent respiratory exacerbations. reports 3-4 EtOH drinks/day. Discussed AA with Dr. Keyes during consult, but patient thinks he is more amenable to 1:1 counseling. Has follow up appointment with dentist to discuss treatment of chronic infection.   PMH: COPD, chronic lower back/neck pain. s/p multiple epidurals/injections for treatment. is f/b pain management. hemorrhoids, f/b colorectal.  [place] - uses bilat hearing aids PSH: hemorrhoidectomy > 20 years ago, abdominal hernia repair with mesh 2024 - [facility],   Social: retired/disability Allergies: NKDA   Oxygen Requirement:  RA-2lpm NC at rest, 4lpm NC on exertion, RA with sleep   High Flow Nasal cannula: [ ] yes [x ] no    Working for income:   [ ] yes [ ] no    Diabetic: [ ] yes [ x] no    Insulin dependent [ ] yes [x ] no   Performs activities of daily living poorly with extreme fatigue at work and as mentioned had passed out in the past and is extremely concerned about his poor quality of life       Occupation: [employer]  Karnofsky score: ***  Exposures: no exposures that would impede his lung function or cause contraindication for transplant procedure  Quality of life: Extremely poor quality of life and exercise tolerance cannot play golf cannot go shopping or carry baggage because of severe dyspnea and extreme COPD by FEV1 criteria and by CT and chest x-ray evaluation  Attends Pulmonary Rehab: N, was referred by Dr. Holder and patient verbalizes today a re-commitment to attending  Prior hospitalizations, ICU admission or intubations: none recently over the past year Hx covid infection, blood transfusions or pregnancies: None  Health maintenance/vaccines:   COVID vax:   Family History:  Social History:    Lives with: alone ETOH/tobacco use: former smoker, 2 ppd for 4 years and stopped 2014, daily etoh - approx 10-15 drinks/week DATA REVIEWED:   PFT/JADE:  03/03/25 FVC 2.88, 70% FEV1, 1.25, 40% TLC, 7.60, 106% DLCO, 10.58, 40%  11/07/2024 FVC 2.44, 61% FEV1 0.98, 32%  12/12/2023 FVC 2.85, 66% FEV1 1.30, 38%  6MWT:  02/26/25 - 366 meters on 3lpm  CT CHEST:  03/28/2024 Trachea and central airways are patent. Bilateral upper lobe predominant emphysematous changes. No parenchymal consolidation, pleural effusion or pneumothorax. No bronchiectasis or honeycombing. Scattered calcified granulomas bilaterally. Right middle lobe 0.2 cm nodule, possibly new from prior examination. Unchanged right apical 0.2 cm nodule.  ECHO:  01/30/2025  1. Left ventricular cavity is normal in size. Left ventricular wall thickness is normal. Left ventricular systolic function is normal with an ejection fraction of 57 % by Steel's method of disks. There are no regional wall motion abnormalities seen.  2. Normal left ventricular diastolic function, with normal left ventricular filling pressure.  3. Normal right ventricular cavity size, with normal wall thickness, and normal right ventricular systolic function.  4. No pericardial effusion seen.  5. No prior echocardiogram is available for comparison.  6. There is no evidence of a left ventricular thrombus.  7. There is normal LV mass and normal geometry.  RHC/LHC:  Coronary AngiographyThe coronary circulation is right dominant.  LMLeft main artery: Angiography shows no disease.  LADLeft anterior descending artery: Angiography shows minor irregularities.  CXCircumflex: Angiography shows no disease.  RCARight coronary artery: Angiography shows minor irregularities.  All questions answered, used teach back method, patient verbalized understanding. [TextBox_11] : 2 [TextBox_13] : 40 [YearQuit] : 2014

## 2025-04-07 NOTE — ASSESSMENT
[FreeTextEntry1] : 68 y/o male PMH COPD, emphysema, presenting to clinic for follow up evaluation for lung transplant.   #LungTransplantEvaluation - financial clearance 11/11/2025 - open evaluation since 12/09/2024  - Workup as below: - PFTs/spirometry, 03/03/2025 - EOT, 02/26/2025 - 6MWT, 02/26/2025 - Karnofsky score, done 04/07/2025 - PA/Lat CXR, 01/21/2025 - CT chest, 01/23/2025 - CT Abd/Pelvis, 01/23/2025 - Quant NM VQ, 02/13/2025 - EKG, 02/17/2025 - TTE w/ bubble study, 01/30/2025 - LHC/RHC, 02/06/2025 - HODAN, 02/13/2025 - Upper/lower venous/arterial duplexes and carotid doppler, 01/21/2025 - DEXA, 01/21/2025 - BaS (esophagram), 02/13/2025 - Fluroscopy of diaphragm, 02/13/2025 - CT colonography, 01/23/2025   - derm clearance, seen 04/03/2025 with current rash - clearance pending - dental clearance, requires procedure for infected teeth/extractions but cannot afford 6-8k OOP, offered [facility] 2nd opinion but patient declined - pending - surgical clearance, 01/03/2025 - infectious disease clearance, pending 03/17/2025 - SW clearance, 01/03/2025, pending additional follow up - psychiatry clearance, seen 02/19/2025 - can consider for transplantation with adequate psychiatric care and EtOH cessation  - dietician clearance, 02/04/2025   - evaluation labwork done 03/17/2025   Follow-ups: - pending completion of the outstanding tests and clearances as above - ETOH cessation - patient to look up local AA, contact Dr. Wali duffy: medication assisted ETOH cessation - dental plan - 04/29/2025 appt to f/u with oral surgeon then return to dentist for clearance - derm clearance - reiterated sending clearance once rash clears  - likely SCM presentation date in 0/4/2025 or 05/2025   RTC in 6 weeks with primary transplant pulmonologist Dr. Hines    Above discussed with attending physician Dr. Hines. All questions answered, used teach back method, patient verbalized understanding.

## 2025-04-07 NOTE — PHYSICAL EXAM
[Benign] : benign [No Cyanosis] : no cyanosis [TextBox_2] : Using accessory muscles of ventilation after walking into the office [TextBox_68] : Decreased BS

## 2025-04-07 NOTE — ASSESSMENT
[FreeTextEntry1] : 68 y/o male PMH COPD, emphysema, presenting to clinic for follow up evaluation for lung transplant.   #LungTransplantEvaluation - financial clearance 11/11/2025 - open evaluation since 12/09/2024  - Workup as below: - PFTs/spirometry, 03/03/2025 - EOT, 02/26/2025 - 6MWT, 02/26/2025 - Karnofsky score, done 04/07/2025 - PA/Lat CXR, 01/21/2025 - CT chest, 01/23/2025 - CT Abd/Pelvis, 01/23/2025 - Quant NM VQ, 02/13/2025 - EKG, 02/17/2025 - TTE w/ bubble study, 01/30/2025 - LHC/RHC, 02/06/2025 - HODAN, 02/13/2025 - Upper/lower venous/arterial duplexes and carotid doppler, 01/21/2025 - DEXA, 01/21/2025 - BaS (esophagram), 02/13/2025 - Fluroscopy of diaphragm, 02/13/2025 - CT colonography, 01/23/2025   - derm clearance, seen 04/03/2025 with current rash - clearance pending - dental clearance, requires procedure for infected teeth/extractions but cannot afford 6-8k OOP, offered [facility] 2nd opinion but patient declined - pending - surgical clearance, 01/03/2025 - infectious disease clearance, pending 03/17/2025 - SW clearance, 01/03/2025, pending additional follow up - psychiatry clearance, seen 02/19/2025 - can consider for transplantation with adequate psychiatric care and EtOH cessation  - dietician clearance, 02/04/2025   - evaluation labwork done 03/17/2025   Follow-ups: - pending completion of the outstanding tests and clearances as above - ETOH cessation - patient to look up local AA, contact Dr. Wali duffy: medication assisted ETOH cessation - dental plan - 04/29/2025 appt to f/u with oral surgeon then return to dentist for clearance - derm clearance - reiterated sending clearance once rash clears  - likely SCM presentation date in 0/4/2025 or 05/2025   RTC in 6 weeks with primary transplant pulmonologist Dr. Hines    Above discussed with attending physician Dr. Hines. All questions answered, used teach back method, patient verbalized understanding.

## 2025-04-07 NOTE — END OF VISIT
[FreeTextEntry3] : As above. Patient seen and examined. 67 year old male with chronic hypoxic respiratory failure secondary to severe COPD  with last exacerbation 11/2024 presents for pre-lung transplant evaluation follow up. Using 4L with exertion. Able to maintain adequate SpO2 at rest on RA. With even minimal exertion becomes very dyspneic however and reports poor quality of life as a result. Work up nearly complete. Barrier to transplant at this time includes active drinking. He has yet to cut back on alcohol intake. He agrees to reach out to AA prior to next visit and follow up with psychiatry. Reiterated that a plan needs to include abstinence in order to proceed. Remainder of plan as a detailed above. [Time Spent: ___ minutes] : I have spent [unfilled] minutes of time on the encounter which excludes teaching and separately reported services.

## 2025-04-17 NOTE — PHYSICAL EXAM
[No Acute Distress] : no acute distress [Well Nourished] : well nourished [Well Developed] : well developed [Well-Appearing] : well-appearing [Normal Voice/Communication] : normal voice/communication [Normal Sclera/Conjunctiva] : normal sclera/conjunctiva [PERRL] : pupils equal round and reactive to light [Normal Outer Ear/Nose] : the outer ears and nose were normal in appearance [No JVD] : no jugular venous distention [No Respiratory Distress] : no respiratory distress  [No Accessory Muscle Use] : no accessory muscle use [Clear to Auscultation] : lungs were clear to auscultation bilaterally [Normal Rate] : normal rate  [Regular Rhythm] : with a regular rhythm [Normal S1, S2] : normal S1 and S2 [No Murmur] : no murmur heard [Soft] : abdomen soft [Non Tender] : non-tender [Non-distended] : non-distended [Grossly Normal Strength/Tone] : grossly normal strength/tone [No Rash] : no rash [Coordination Grossly Intact] : coordination grossly intact [No Focal Deficits] : no focal deficits [Normal Gait] : normal gait [Speech Grossly Normal] : speech grossly normal [Normal Affect] : the affect was normal [Alert and Oriented x3] : oriented to person, place, and time [Normal Mood] : the mood was normal [Normal Insight/Judgement] : insight and judgment were intact [de-identified] : 1+ bilateral  [de-identified] : 2 dressing on back noted

## 2025-04-17 NOTE — ASSESSMENT
[FreeTextEntry1] : dressing on back were removed (noted stitches on three spots, intact and no discharge or erythema) and bacitracin applied and new nonadherent gauze was applied   med refilled  followup labs  bp elevated and mild LE edema , needs low salt diet - recommend add hctz- will monitor creatinine - advised will repeat kidney function in 1 month followup to repeat lab and check bp

## 2025-04-17 NOTE — HISTORY OF PRESENT ILLNESS
[FreeTextEntry1] : follow for med renewal and change bandage from skin biopsy    [de-identified] : no chest pain, no sob, no cough, no fever, no dizziness, no abdominal pain, no n/v/d/c/melena/brbpr/hematuria/dysuria states had biopsy of upper back and was told to change dressing.  admits to LE edema intermittent.

## 2025-04-21 NOTE — PROCEDURE
[FreeTextEntry1] : ashwini done 24: mod obstruction by ATS criteria; severe by GOLD; FEV1 decreased from previous  CT chest done 25 reviewed  oxymetry done 24: O2 saturation on RA at rest: 95% O2 saturation on RA walkin% O2 saturation on 2 LPM pulsed dose walkin%     ------------   EXAM: [id number] - CT LDCT LUNG CA SCREENING - ORDERED BY: RITA LINDO   PROCEDURE DATE: 2024    INTERPRETATION: INDICATION: 100 pack year history of smoking. Individual is Former smoker. Lung cancer screening.  TECHNIQUE: Low dose CT scan of the chest was obtained without intravenous contrast.  COMPARISON: 2023  FINDINGS: Lymph nodes: No enlarged mediastinal, hilar or axillary lymph nodes Heart/vasculature: No pericardial effusion. Thoracic aorta and main pulmonary artery are normal in caliber. Mild atherosclerotic calcifications coronary arteries.  Airways/lungs/pleura: Trachea and central airways are patent. Bilateral upper lobe predominant emphysematous changes. No parenchymal consolidation, pleural effusion or pneumothorax. No bronchiectasis or honeycombing. Scattered calcified granulomas bilaterally.  Right middle lobe 0.2 cm nodule (series 5 image 644), possibly new from prior.. Unchanged right apical 0.2 cm nodule.  Upper abdomen: Partially imaged upper abdomen demonstrates no acute abnormality.  Bones/soft tissues: No suspicious lytic or blastic lesion.  IMPRESSION:  Right middle lobe 0.2 cm nodule, possibly new from prior examination. Unchanged right apical 0.2 cm nodule.  Lung-RADS category: 2: Benign appearance or behavior. Nodules with very low likelihood of becoming a clinically active cancer due to size or lack of growth. Probability of malignancy < 1%.  Recommendation: Continue annual screening with low-dose chest CT in 12 months.  --- End of Report ---       ELLIOT LANDAU MD; Attending Radiologist This document has been electronically signed. 2024 7:28PM

## 2025-04-21 NOTE — PLAN
[TextEntry] : Trial of ohtuvayre. Continue acapella valve; increase use. Continue breztri. Albuterol prn.  Singulair. O2 to be used with exertion and sleep.  No Eos so dupixent not indicated. Could not tolerate daliresp. Emphysema pattern does not appear amenable to EBV. GI eval. Flonase, antihistamine. Nasal saline. Cardiology f/u with Dr Richardson. LDCT 1/2026.  F/U with pulm rehab. Exercise is essential. Annual flu vaccine.   Will reach out to Dr Keyes re: outpt rehab programs for EtOH.   Lung transplant f/u with [facility] Lung Transplant Team.

## 2025-04-21 NOTE — HISTORY OF PRESENT ILLNESS
[Former] : former [>= 20 pack years] : >= 20 pack years [FreeTextEntry1] : Hx COPD.  Still with SOB. Very limited in movement due to SOB. 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   Having a good day today; feeling a little better.   On breztri with spacer.  Albuterol helps a little.  On singulair. Not on daliresp b/c it didn't agree with him.  Ordered zithro TIW but stopped as he is on Abx for his gums. He got acapella and that is helping with mucous but not using regularly.  [place] done 3/28/24 showed severe obstruction; FEV1 not much change 39% pred  Does not do well with dry powder inhalers.   Hx hypoxia with exertion. Using it with exertion mainly.  Was seeing transplant team for eval; deemed not a candidate now due to EtOH and infection in gums and needing extensive dental work.  Is trying to cut down on drinking. Was advised to start AA; he says he will. Also would like numbers for outpt rehab for EtOH mgmt.    Some LE edema; b/l.   Sees Dr Richardson.  Has appt with [facility].    Quit smoking sandra 2014. Smoked 40 years 2 ppd.   [YearQuit] : 2014

## 2025-04-21 NOTE — PHYSICAL EXAM
[General Appearance - In No Acute Distress] : no acute distress [Normal Conjunctiva] : the conjunctiva exhibited no abnormalities [Low Lying Soft Palate] : low lying soft palate [Neck Appearance] : the appearance of the neck was normal [] : the neck was supple [Heart Rate And Rhythm] : heart rate and rhythm were normal [Heart Sounds] : normal S1 and S2 [Bowel Sounds] : normal bowel sounds [Abdomen Soft] : soft [Abnormal Walk] : normal gait [Nail Clubbing] : no clubbing of the fingernails [Cyanosis, Localized] : no localized cyanosis [1+ Pitting] : 1+  pitting [Skin Color & Pigmentation] : normal skin color and pigmentation [Cranial Nerves] : cranial nerves 2-12 were intact [No Focal Deficits] : no focal deficits [Oriented To Time, Place, And Person] : oriented to person, place, and time [Impaired Insight] : insight and judgment were intact [Affect] : the affect was normal [Normal Rate] : the respiratory rate was normal [Rate ___] : at [unfilled] breaths per minute [Normal Rhythm/Effort] : normal respiratory rhythm and effort [Clear Bilaterally] : the lungs were clear to auscultation bilaterally [Rales / Crackles Bilateral] : no rales or crackles were heard [Normal Oropharynx] : abnormal oropharynx [Normal Breath Sounds] : normal bilateral breath sounds

## 2025-05-15 NOTE — HISTORY OF PRESENT ILLNESS
[FreeTextEntry1] : 1 month follow up bp  [de-identified] : no chest pain, no cough, no fever, no dizziness, no abdominal pain, no n/v/d/c/melena/brbpr/hematuria/dysuria states having trouble getting inhalation med from pharmacy - continued intermittent sob  no chest pain

## 2025-05-15 NOTE — ASSESSMENT
[FreeTextEntry1] : lab reviewed improved  bp improved  follow up with pulmonology for eval  repeat labs in 2 months

## 2025-06-20 NOTE — ASSESSMENT
[FreeTextEntry1] : EKG: Sinus rhythm at 83 bpm, low voltage, no significant ST or T wave abnormalities.   ECG obtained to assist in diagnosis and management of assessed problem(s).  Laboratory data: ---4/27/2022--3/31/2022--4/15/2021--12/9/2019--3/31/22--4/6/22-----5/23/22--12/9/24--5/7/25--4/18/25 Creat-2.34-----1.72------------ 1.45------ 1.23----------------------2.34-------1.45-------1.24----1.29 Chol------------------------------- 215-------------------267-------------------------------------190----------------221 HDL-------------------------------- 81--------------------99---------------------------------------113---------------141 LDL------------------------------- 134-------------------141---------------------------------------60----------------72 V0j--------------------------------7.6-------------------------------------------------------------------------5.4  Echocardiogram ([facility]) 1/30/2025 Normal LV size and function LVEF 57% No pericardial effusion Normal right sided chamber size and function Estimated pulmonary artery pressure 34 mmHg.  Echocardiogram 5/22/23: Normal LVEF 55-60%' Mildly dilated ascending aorta measuring 4.2 cm  Echocardiogram 1/23/2020 Normal LV size and function EF 55 to 60% No LVH No evidence of pulmonary hypertension No significant valvular disease.  Echocardiogram  11/16/18 normal left ventricular size and function. mild pulmonary hypertension (40.5 mmHg).  There is mild dilatation of the ascending aorta 4 cm, which is unchanged in comparison with the prior study.   Cardiac catheterization 2/6/2025 Right heart cath: RA: 10 PA P: 42/21, mean 29 PCW 12 PVR: 3.06 [place] unit w/u Coronaries: Diffuse minor luminal irregularities no significant disease.  Lower extremity arterial duplex: 1/21/2025: No significant obstructive disease.  Upper extremity arterial duplex 1/21/2025 No significant obstructive disease  Lower extremity venous duplex: No evidence of DVT  Carotid duplex 1/21/2025 minor atheromatous disease  Impression:  1.  Based on progression of symptoms, a lung transplant evaluation is now in progress and testing is as per above.  2.  Blood pressure well controlled on Diltiazem 240 mg BID. Echocardiogram showed proximal ascending aorta measuring 4.2 cm, no change in aortic dimensions compared to prior study. No evidence of pulmonary hypertension.   3. He remains smoke-free. Quit smoking 10 years ago after an 80-pack year history. Sees pulmonary regularly.   4.  Tolerating Rosuvastatin 10 mg for HLD.  Most recent lipid panel shows good control.  5.  There is diffuse mild atheromatous disease seen both in his coronaries as well as peripheral arterial vasculature but no high-grade stenoses.  Plan: 1.  Continue Diltiazem 240 mg BID and HCTZ 12.5.   2.  e. Monitor blood pressure daily and contact us if it does not stay in 130/80 or below range. Avoid salt in his diet.   3.  Continue Rosuvastatin. Follow a low-fat, low-carbohydrate diet.   4.  Patient proceeding with transplant evaluation.      No change in management from cardiology perspective.  Pt knows to call if any new or worsening symptoms. In the absence of any cardiac associated symptoms clinical follow up can be made in 6 months.

## 2025-06-20 NOTE — HISTORY OF PRESENT ILLNESS
[FreeTextEntry1] : Patient is being evaluated for a possible lung transplant.  He is in the process of this evaluation having completed numerous rounds of testing at [facility] including right and left heart cardiac catheterization, echocardiography, lower extremity arterial and venous duplex upper extremity arterial duplex and carotid duplex. He has no new complaints.   due to pulmonary issues, he is still having significant dyspnea and limitation.  Has chronic bilateral pretibial and ankle edema which is unchanged.

## 2025-06-20 NOTE — REVIEW OF SYSTEMS
[Weight Gain (___ Lbs)] : [unfilled] ~Ulb weight gain [Lower Ext Edema] : lower extremity edema [Joint Pain] : joint pain [Negative] : Neurological [FreeTextEntry6] : chronic PINA

## 2025-06-20 NOTE — PHYSICAL EXAM
[Well Developed] : well developed [Well Nourished] : well nourished [No Acute Distress] : no acute distress [Normal Conjunctiva] : normal conjunctiva [Normal Venous Pressure] : normal venous pressure [No Carotid Bruit] : no carotid bruit [Normal S1, S2] : normal S1, S2 [No Rub] : no rub [No Gallop] : no gallop [Murmur] : murmur [Clear Lung Fields] : clear lung fields [Good Air Entry] : good air entry [No Respiratory Distress] : no respiratory distress  [Soft] : abdomen soft [Non Tender] : non-tender [No Masses/organomegaly] : no masses/organomegaly [Normal Bowel Sounds] : normal bowel sounds [Normal Gait] : normal gait [No Cyanosis] : no cyanosis [No Clubbing] : no clubbing [No Varicosities] : no varicosities [Edema ___] : edema [unfilled] [No Rash] : no rash [No Skin Lesions] : no skin lesions [Moves all extremities] : moves all extremities [No Focal Deficits] : no focal deficits [Normal Speech] : normal speech [Alert and Oriented] : alert and oriented [Normal memory] : normal memory [de-identified] : I/VI systolic murmur

## 2025-07-09 NOTE — PHYSICAL EXAM
Anesthesia Evaluation     Patient summary reviewed and Nursing notes reviewed   history of anesthetic complications:  prolonged sedation  NPO Solid Status: > 8 hours  NPO Liquid Status: > 4 hours           Airway   Mallampati: II  Neck ROM: full  No difficulty expected  Dental - normal exam     Pulmonary    (+) ,shortness of breath, wheezes  Cardiovascular     Rhythm: regular    (+) hypertension, hyperlipidemia      Neuro/Psych  GI/Hepatic/Renal/Endo    (+) renal disease-    Musculoskeletal     Abdominal    Substance History      OB/GYN          Other   arthritis,   history of cancer                Anesthesia Plan    ASA 2     general     intravenous induction     Anesthetic plan, risks, benefits, and alternatives have been provided, discussed and informed consent has been obtained with: patient.    CODE STATUS:          [No Acute Distress] : no acute distress [Well Nourished] : well nourished [Well Developed] : well developed [Well-Appearing] : well-appearing [Normal Voice/Communication] : normal voice/communication [Normal Sclera/Conjunctiva] : normal sclera/conjunctiva [PERRL] : pupils equal round and reactive to light [Normal Outer Ear/Nose] : the outer ears and nose were normal in appearance [No JVD] : no jugular venous distention [No Respiratory Distress] : no respiratory distress  [No Accessory Muscle Use] : no accessory muscle use [Clear to Auscultation] : lungs were clear to auscultation bilaterally [Normal Rate] : normal rate  [Regular Rhythm] : with a regular rhythm [Normal S1, S2] : normal S1 and S2 [No Murmur] : no murmur heard [Pedal Pulses Present] : the pedal pulses are present [Soft] : abdomen soft [Non Tender] : non-tender [Non-distended] : non-distended [Grossly Normal Strength/Tone] : grossly normal strength/tone [No Rash] : no rash [Coordination Grossly Intact] : coordination grossly intact [No Focal Deficits] : no focal deficits [Normal Gait] : normal gait [Speech Grossly Normal] : speech grossly normal [Normal Affect] : the affect was normal [Alert and Oriented x3] : oriented to person, place, and time [Normal Mood] : the mood was normal [Normal Insight/Judgement] : insight and judgment were intact [de-identified] : bilateral LE edema 1+ , nontender,

## 2025-07-09 NOTE — ASSESSMENT
[FreeTextEntry1] : add furosemide followup in 1 week  low salt diet  compression socks discussed  if persisting will get imaging if needed

## 2025-07-09 NOTE — HISTORY OF PRESENT ILLNESS
[FreeTextEntry8] : pt here for swollen legs no pain - states when he doubled his hctz helped some with swelling - but swelling is persisting now . no chest pain or sob . no leg pain

## 2025-07-16 NOTE — PHYSICAL EXAM
[No Acute Distress] : no acute distress [Well Nourished] : well nourished [Well Developed] : well developed [Well-Appearing] : well-appearing [Normal Voice/Communication] : normal voice/communication [Normal Sclera/Conjunctiva] : normal sclera/conjunctiva [PERRL] : pupils equal round and reactive to light [Normal Outer Ear/Nose] : the outer ears and nose were normal in appearance [No JVD] : no jugular venous distention [No Respiratory Distress] : no respiratory distress  [No Accessory Muscle Use] : no accessory muscle use [Clear to Auscultation] : lungs were clear to auscultation bilaterally [Normal Rate] : normal rate  [Regular Rhythm] : with a regular rhythm [Normal S1, S2] : normal S1 and S2 [No Murmur] : no murmur heard [Pedal Pulses Present] : the pedal pulses are present [Soft] : abdomen soft [Non Tender] : non-tender [Non-distended] : non-distended [Grossly Normal Strength/Tone] : grossly normal strength/tone [No Rash] : no rash [Coordination Grossly Intact] : coordination grossly intact [No Focal Deficits] : no focal deficits [Normal Gait] : normal gait [Speech Grossly Normal] : speech grossly normal [Normal Affect] : the affect was normal [Alert and Oriented x3] : oriented to person, place, and time [Normal Mood] : the mood was normal [Normal Insight/Judgement] : insight and judgment were intact [de-identified] : bilateral LE edema 1+ , nontender,

## 2025-07-16 NOTE — HISTORY OF PRESENT ILLNESS
[FreeTextEntry1] : pt here for f/u leg swelling  [de-identified] : LE edema has improved some but only on it for 3 days - no chest pain, no sob, no cough, no fever, no dizziness, no abdominal pain, no n/v/d/c/melena/brbpr/hematuria/dysuria

## 2025-07-16 NOTE — ASSESSMENT
[FreeTextEntry1] : slight improvement in edema- recommend take 40mg lasix for 3 days and then go back to 20mg daily  us venous and arterial doppler up to date in chart this yr  follow up labs

## 2025-07-24 NOTE — HISTORY OF PRESENT ILLNESS
[Former] : former [>= 20 pack years] : >= 20 pack years [FreeTextEntry1] : Hx COPD.  SOB still improved since ohtuvayre and with doing OK.     On breztri with spacer.  Albuterol helps a little.  On singulair. Ohtuvayre. Not on daliresp b/c it didn't agree with him.  Ordered zithro TIW but stopped as he is on Abx for his gums. He got acapella and that is helping with mucous but not using regularly.  Doing OK; today  last day.   [place] done 3/28/24 showed severe obstruction; FEV1 not much change 39% pred  Does not do well with dry powder inhalers.   Hx hypoxia with exertion. Using it with exertion mainly. On 3 LPM doing exercise.   Was seeing transplant team for eval; deemed not a candidate now due to EtOH and infection in gums and needing extensive dental work.  Is trying to cut down on drinking. Was advised to start AA; he says he will. Also would like numbers for outpt rehab for EtOH mgmt.  No longer a patient of Dr Keyes. Advised f/u with SW from transplant team.  Doing a self program. Has not gone to AA. Still drinking 2 drinks per day.   Some LE edema; b/l.   Sees Dr Richardson.  Quit smoking sandra 2014. Smoked 40 years 2 ppd.   [YearQuit] : 2014

## 2025-07-24 NOTE — PROCEDURE
[FreeTextEntry1] : ashwini done 24: mod obstruction by ATS criteria; severe by GOLD; FEV1 decreased from previous  CT chest done 25 reviewed  oxymetry done 24: O2 saturation on RA at rest: 95% O2 saturation on RA walkin% O2 saturation on 2 LPM pulsed dose walkin%     ------------   EXAM: [id number] - CT LDCT LUNG CA SCREENING - ORDERED BY: RITA LINDO   PROCEDURE DATE: 2024    INTERPRETATION: INDICATION: 100 pack year history of smoking. Individual is Former smoker. Lung cancer screening.  TECHNIQUE: Low dose CT scan of the chest was obtained without intravenous contrast.  COMPARISON: 2023  FINDINGS: Lymph nodes: No enlarged mediastinal, hilar or axillary lymph nodes Heart/vasculature: No pericardial effusion. Thoracic aorta and main pulmonary artery are normal in caliber. Mild atherosclerotic calcifications coronary arteries.  Airways/lungs/pleura: Trachea and central airways are patent. Bilateral upper lobe predominant emphysematous changes. No parenchymal consolidation, pleural effusion or pneumothorax. No bronchiectasis or honeycombing. Scattered calcified granulomas bilaterally.  Right middle lobe 0.2 cm nodule (series 5 image 644), possibly new from prior.. Unchanged right apical 0.2 cm nodule.  Upper abdomen: Partially imaged upper abdomen demonstrates no acute abnormality.  Bones/soft tissues: No suspicious lytic or blastic lesion.  IMPRESSION:  Right middle lobe 0.2 cm nodule, possibly new from prior examination. Unchanged right apical 0.2 cm nodule.  Lung-RADS category: 2: Benign appearance or behavior. Nodules with very low likelihood of becoming a clinically active cancer due to size or lack of growth. Probability of malignancy < 1%.  Recommendation: Continue annual screening with low-dose chest CT in 12 months.  --- End of Report ---       ELLIOT LANDAU MD; Attending Radiologist This document has been electronically signed. 2024 7:28PM

## 2025-07-24 NOTE — PHYSICAL EXAM
[General Appearance - In No Acute Distress] : no acute distress [Normal Conjunctiva] : the conjunctiva exhibited no abnormalities [Low Lying Soft Palate] : low lying soft palate [Neck Appearance] : the appearance of the neck was normal [] : the neck was supple [Heart Rate And Rhythm] : heart rate and rhythm were normal [Heart Sounds] : normal S1 and S2 [Bowel Sounds] : normal bowel sounds [Abdomen Soft] : soft [Abnormal Walk] : normal gait [Nail Clubbing] : no clubbing of the fingernails [Cyanosis, Localized] : no localized cyanosis [1+ Pitting] : 1+  pitting [Skin Color & Pigmentation] : normal skin color and pigmentation [Cranial Nerves] : cranial nerves 2-12 were intact [No Focal Deficits] : no focal deficits [Oriented To Time, Place, And Person] : oriented to person, place, and time [Impaired Insight] : insight and judgment were intact [Affect] : the affect was normal [Normal Rate] : the respiratory rate was normal [Rate ___] : at [unfilled] breaths per minute [Normal Rhythm/Effort] : normal respiratory rhythm and effort [Clear Bilaterally] : the lungs were clear to auscultation bilaterally [Normal Breath Sounds] : normal bilateral breath sounds [Rales / Crackles Bilateral] : no rales or crackles were heard [Normal Oropharynx] : abnormal oropharynx